# Patient Record
Sex: FEMALE | Race: BLACK OR AFRICAN AMERICAN | Employment: FULL TIME | URBAN - METROPOLITAN AREA
[De-identification: names, ages, dates, MRNs, and addresses within clinical notes are randomized per-mention and may not be internally consistent; named-entity substitution may affect disease eponyms.]

---

## 2017-05-18 ENCOUNTER — APPOINTMENT (OUTPATIENT)
Dept: CT IMAGING | Age: 52
End: 2017-05-18
Attending: EMERGENCY MEDICINE
Payer: COMMERCIAL

## 2017-05-18 ENCOUNTER — APPOINTMENT (OUTPATIENT)
Dept: GENERAL RADIOLOGY | Age: 52
End: 2017-05-18
Attending: EMERGENCY MEDICINE
Payer: COMMERCIAL

## 2017-05-18 ENCOUNTER — HOSPITAL ENCOUNTER (EMERGENCY)
Age: 52
Discharge: HOME | End: 2017-05-18
Attending: EMERGENCY MEDICINE
Payer: COMMERCIAL

## 2017-05-18 VITALS
HEART RATE: 86 BPM | TEMPERATURE: 98.6 F | BODY MASS INDEX: 24.29 KG/M2 | OXYGEN SATURATION: 99 % | DIASTOLIC BLOOD PRESSURE: 90 MMHG | SYSTOLIC BLOOD PRESSURE: 148 MMHG | RESPIRATION RATE: 16 BRPM | WEIGHT: 132 LBS | HEIGHT: 62 IN

## 2017-05-18 DIAGNOSIS — R07.9 CHEST PAIN, UNSPECIFIED TYPE: Primary | ICD-10-CM
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
## 2017-05-18 DIAGNOSIS — M54.2 NECK PAIN: ICD-10-CM

## 2017-05-18 LAB
ALBUMIN SERPL BCP-MCNC: 3.7 G/DL (ref 3.5–5)
ALBUMIN/GLOB SERPL: 0.9 {RATIO} (ref 1.1–2.2)
ALP SERPL-CCNC: 59 U/L (ref 45–117)
ALT SERPL-CCNC: 34 U/L (ref 12–78)
ANION GAP BLD CALC-SCNC: 3 MMOL/L (ref 5–15)
AST SERPL W P-5'-P-CCNC: 27 U/L (ref 15–37)
ATRIAL RATE: 63 BPM
BASOPHILS # BLD AUTO: 0 K/UL (ref 0–0.1)
BASOPHILS # BLD: 0 % (ref 0–1)
BILIRUB SERPL-MCNC: 0.2 MG/DL (ref 0.2–1)
BUN SERPL-MCNC: 14 MG/DL (ref 6–20)
BUN/CREAT SERPL: 16 (ref 12–20)
CALCIUM SERPL-MCNC: 9 MG/DL (ref 8.5–10.1)
CALCULATED P AXIS, ECG09: 12 DEGREES
CALCULATED R AXIS, ECG10: -28 DEGREES
CALCULATED T AXIS, ECG11: 11 DEGREES
CHLORIDE SERPL-SCNC: 106 MMOL/L (ref 97–108)
CO2 SERPL-SCNC: 31 MMOL/L (ref 21–32)
CREAT SERPL-MCNC: 0.89 MG/DL (ref 0.55–1.02)
DIAGNOSIS, 93000: NORMAL
EOSINOPHIL # BLD: 0.2 K/UL (ref 0–0.4)
EOSINOPHIL NFR BLD: 6 % (ref 0–7)
ERYTHROCYTE [DISTWIDTH] IN BLOOD BY AUTOMATED COUNT: 13.7 % (ref 11.5–14.5)
GLOBULIN SER CALC-MCNC: 3.9 G/DL (ref 2–4)
GLUCOSE SERPL-MCNC: 114 MG/DL (ref 65–100)
HCT VFR BLD AUTO: 35.9 % (ref 35–47)
HGB BLD-MCNC: 11.2 G/DL (ref 11.5–16)
LYMPHOCYTES # BLD AUTO: 27 % (ref 12–49)
LYMPHOCYTES # BLD: 1.1 K/UL (ref 0.8–3.5)
MCH RBC QN AUTO: 28 PG (ref 26–34)
MCHC RBC AUTO-ENTMCNC: 31.2 G/DL (ref 30–36.5)
MCV RBC AUTO: 89.8 FL (ref 80–99)
MONOCYTES # BLD: 0.5 K/UL (ref 0–1)
MONOCYTES NFR BLD AUTO: 12 % (ref 5–13)
NEUTS SEG # BLD: 2.2 K/UL (ref 1.8–8)
NEUTS SEG NFR BLD AUTO: 55 % (ref 32–75)
P-R INTERVAL, ECG05: 154 MS
PLATELET # BLD AUTO: 231 K/UL (ref 150–400)
POTASSIUM SERPL-SCNC: 4.6 MMOL/L (ref 3.5–5.1)
PROT SERPL-MCNC: 7.6 G/DL (ref 6.4–8.2)
Q-T INTERVAL, ECG07: 412 MS
QRS DURATION, ECG06: 78 MS
QTC CALCULATION (BEZET), ECG08: 421 MS
RBC # BLD AUTO: 4 M/UL (ref 3.8–5.2)
SODIUM SERPL-SCNC: 140 MMOL/L (ref 136–145)
VENTRICULAR RATE, ECG03: 63 BPM
WBC # BLD AUTO: 4 K/UL (ref 3.6–11)

## 2017-05-18 PROCEDURE — 93005 ELECTROCARDIOGRAM TRACING: CPT

## 2017-05-18 PROCEDURE — 72072 X-RAY EXAM THORAC SPINE 3VWS: CPT

## 2017-05-18 PROCEDURE — 96374 THER/PROPH/DIAG INJ IV PUSH: CPT

## 2017-05-18 PROCEDURE — 99285 EMERGENCY DEPT VISIT HI MDM: CPT

## 2017-05-18 PROCEDURE — 85025 COMPLETE CBC W/AUTO DIFF WBC: CPT | Performed by: EMERGENCY MEDICINE

## 2017-05-18 PROCEDURE — 72125 CT NECK SPINE W/O DYE: CPT

## 2017-05-18 PROCEDURE — 80053 COMPREHEN METABOLIC PANEL: CPT | Performed by: EMERGENCY MEDICINE

## 2017-05-18 PROCEDURE — 72128 CT CHEST SPINE W/O DYE: CPT

## 2017-05-18 PROCEDURE — 36415 COLL VENOUS BLD VENIPUNCTURE: CPT | Performed by: EMERGENCY MEDICINE

## 2017-05-18 PROCEDURE — 74011250636 HC RX REV CODE- 250/636: Performed by: EMERGENCY MEDICINE

## 2017-05-18 PROCEDURE — 71020 XR CHEST PA LAT: CPT

## 2017-05-18 RX ORDER — KETOROLAC TROMETHAMINE 30 MG/ML
30 INJECTION, SOLUTION INTRAMUSCULAR; INTRAVENOUS
Status: COMPLETED | OUTPATIENT
Start: 2017-05-18 | End: 2017-05-18

## 2017-05-18 RX ADMIN — KETOROLAC TROMETHAMINE 30 MG: 30 INJECTION, SOLUTION INTRAMUSCULAR at 09:50
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
## 2017-05-18 NOTE — ED NOTES
Dr. Sri Escoto reviewed discharge instructions with the patient. The patient verbalized understanding. All questions and concerns were addressed. The patient declined a wheelchair and is discharged ambulatory in the care of family members with instructions and prescriptions in hand. Pt is alert and oriented x 4. Respirations are clear and unlabored.
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
## 2017-05-18 NOTE — ED NOTES
Bedside and Verbal shift change report given to RN Merlyn Lilly (oncoming nurse) by Edith Nieto (offgoing nurse). Report included the following information SBAR, Kardex, ED Summary, MAR, Recent Results and Med Rec Status.
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
## 2022-02-20 ENCOUNTER — APPOINTMENT (OUTPATIENT)
Dept: CT IMAGING | Age: 57
DRG: 062 | End: 2022-02-20
Attending: EMERGENCY MEDICINE
Payer: COMMERCIAL

## 2022-02-20 ENCOUNTER — HOSPITAL ENCOUNTER (INPATIENT)
Age: 57
LOS: 3 days | Discharge: HOME HEALTH | DRG: 062 | End: 2022-02-23
Attending: EMERGENCY MEDICINE | Admitting: INTERNAL MEDICINE
Payer: COMMERCIAL

## 2022-02-20 DIAGNOSIS — R20.0 LEFT SIDED NUMBNESS: ICD-10-CM

## 2022-02-20 DIAGNOSIS — R53.1 ACUTE LEFT-SIDED WEAKNESS: ICD-10-CM

## 2022-02-20 DIAGNOSIS — I63.9 CEREBROVASCULAR ACCIDENT (CVA), UNSPECIFIED MECHANISM (HCC): Primary | ICD-10-CM

## 2022-02-20 LAB
ALBUMIN SERPL-MCNC: 3.8 G/DL (ref 3.5–5)
ALBUMIN/GLOB SERPL: 1 {RATIO} (ref 1.1–2.2)
ALP SERPL-CCNC: 64 U/L (ref 45–117)
ALT SERPL-CCNC: 39 U/L (ref 12–78)
ANION GAP SERPL CALC-SCNC: 6 MMOL/L (ref 5–15)
AST SERPL-CCNC: 26 U/L (ref 15–37)
BASOPHILS # BLD: 0 K/UL (ref 0–0.1)
BASOPHILS NFR BLD: 0 % (ref 0–1)
BILIRUB SERPL-MCNC: 0.2 MG/DL (ref 0.2–1)
BUN SERPL-MCNC: 18 MG/DL (ref 6–20)
BUN/CREAT SERPL: 21 (ref 12–20)
CALCIUM SERPL-MCNC: 9.6 MG/DL (ref 8.5–10.1)
CHLORIDE SERPL-SCNC: 104 MMOL/L (ref 97–108)
CHOLEST SERPL-MCNC: 241 MG/DL
CO2 SERPL-SCNC: 28 MMOL/L (ref 21–32)
COMMENT, HOLDF: NORMAL
CREAT SERPL-MCNC: 0.86 MG/DL (ref 0.55–1.02)
DIFFERENTIAL METHOD BLD: ABNORMAL
EOSINOPHIL # BLD: 0.2 K/UL (ref 0–0.4)
EOSINOPHIL NFR BLD: 4 % (ref 0–7)
ERYTHROCYTE [DISTWIDTH] IN BLOOD BY AUTOMATED COUNT: 13.2 % (ref 11.5–14.5)
EST. AVERAGE GLUCOSE BLD GHB EST-MCNC: 143 MG/DL
GLOBULIN SER CALC-MCNC: 3.8 G/DL (ref 2–4)
GLUCOSE BLD STRIP.AUTO-MCNC: 119 MG/DL (ref 65–117)
GLUCOSE SERPL-MCNC: 118 MG/DL (ref 65–100)
HBA1C MFR BLD: 6.6 % (ref 4–5.6)
HCT VFR BLD AUTO: 36 % (ref 35–47)
HDLC SERPL-MCNC: 49 MG/DL
HDLC SERPL: 4.9 {RATIO} (ref 0–5)
HGB BLD-MCNC: 11.3 G/DL (ref 11.5–16)
IMM GRANULOCYTES # BLD AUTO: 0 K/UL (ref 0–0.04)
IMM GRANULOCYTES NFR BLD AUTO: 0 % (ref 0–0.5)
INR PPP: 1 (ref 0.9–1.1)
LDLC SERPL CALC-MCNC: 164 MG/DL (ref 0–100)
LYMPHOCYTES # BLD: 1.8 K/UL (ref 0.8–3.5)
LYMPHOCYTES NFR BLD: 39 % (ref 12–49)
MCH RBC QN AUTO: 28.3 PG (ref 26–34)
MCHC RBC AUTO-ENTMCNC: 31.4 G/DL (ref 30–36.5)
MCV RBC AUTO: 90 FL (ref 80–99)
MONOCYTES # BLD: 0.5 K/UL (ref 0–1)
MONOCYTES NFR BLD: 10 % (ref 5–13)
NEUTS SEG # BLD: 2.1 K/UL (ref 1.8–8)
NEUTS SEG NFR BLD: 47 % (ref 32–75)
NRBC # BLD: 0 K/UL (ref 0–0.01)
NRBC BLD-RTO: 0 PER 100 WBC
PLATELET # BLD AUTO: 243 K/UL (ref 150–400)
PMV BLD AUTO: 10.9 FL (ref 8.9–12.9)
POTASSIUM SERPL-SCNC: 3.6 MMOL/L (ref 3.5–5.1)
PROT SERPL-MCNC: 7.6 G/DL (ref 6.4–8.2)
PROTHROMBIN TIME: 10.5 SEC (ref 9–11.1)
RBC # BLD AUTO: 4 M/UL (ref 3.8–5.2)
SAMPLES BEING HELD,HOLD: NORMAL
SERVICE CMNT-IMP: ABNORMAL
SODIUM SERPL-SCNC: 138 MMOL/L (ref 136–145)
TRIGL SERPL-MCNC: 140 MG/DL (ref ?–150)
TROPONIN-HIGH SENSITIVITY: 6 NG/L (ref 0–51)
TSH SERPL DL<=0.05 MIU/L-ACNC: 1.06 UIU/ML (ref 0.36–3.74)
VLDLC SERPL CALC-MCNC: 28 MG/DL
WBC # BLD AUTO: 4.6 K/UL (ref 3.6–11)

## 2022-02-20 PROCEDURE — 93005 ELECTROCARDIOGRAM TRACING: CPT

## 2022-02-20 PROCEDURE — 4A03X5D MEASUREMENT OF ARTERIAL FLOW, INTRACRANIAL, EXTERNAL APPROACH: ICD-10-PCS | Performed by: RADIOLOGY

## 2022-02-20 PROCEDURE — 82962 GLUCOSE BLOOD TEST: CPT

## 2022-02-20 PROCEDURE — 70496 CT ANGIOGRAPHY HEAD: CPT

## 2022-02-20 PROCEDURE — 99285 EMERGENCY DEPT VISIT HI MDM: CPT

## 2022-02-20 PROCEDURE — 74011250636 HC RX REV CODE- 250/636: Performed by: NURSE PRACTITIONER

## 2022-02-20 PROCEDURE — 3E03317 INTRODUCTION OF OTHER THROMBOLYTIC INTO PERIPHERAL VEIN, PERCUTANEOUS APPROACH: ICD-10-PCS | Performed by: EMERGENCY MEDICINE

## 2022-02-20 PROCEDURE — 74011000250 HC RX REV CODE- 250: Performed by: EMERGENCY MEDICINE

## 2022-02-20 PROCEDURE — APPNB60 APP NON BILLABLE TIME 46-60 MINS: Performed by: NURSE PRACTITIONER

## 2022-02-20 PROCEDURE — 36415 COLL VENOUS BLD VENIPUNCTURE: CPT

## 2022-02-20 PROCEDURE — 96374 THER/PROPH/DIAG INJ IV PUSH: CPT

## 2022-02-20 PROCEDURE — 74011000636 HC RX REV CODE- 636: Performed by: RADIOLOGY

## 2022-02-20 PROCEDURE — 70450 CT HEAD/BRAIN W/O DYE: CPT

## 2022-02-20 PROCEDURE — 96365 THER/PROPH/DIAG IV INF INIT: CPT

## 2022-02-20 PROCEDURE — 74011000258 HC RX REV CODE- 258: Performed by: EMERGENCY MEDICINE

## 2022-02-20 PROCEDURE — 80053 COMPREHEN METABOLIC PANEL: CPT

## 2022-02-20 PROCEDURE — 80061 LIPID PANEL: CPT

## 2022-02-20 PROCEDURE — 74011250636 HC RX REV CODE- 250/636

## 2022-02-20 PROCEDURE — 65270000029 HC RM PRIVATE

## 2022-02-20 PROCEDURE — 74011250637 HC RX REV CODE- 250/637: Performed by: NURSE PRACTITIONER

## 2022-02-20 PROCEDURE — 85610 PROTHROMBIN TIME: CPT

## 2022-02-20 PROCEDURE — 84484 ASSAY OF TROPONIN QUANT: CPT

## 2022-02-20 PROCEDURE — 94762 N-INVAS EAR/PLS OXIMTRY CONT: CPT

## 2022-02-20 PROCEDURE — 96375 TX/PRO/DX INJ NEW DRUG ADDON: CPT

## 2022-02-20 PROCEDURE — 65610000006 HC RM INTENSIVE CARE

## 2022-02-20 PROCEDURE — 85025 COMPLETE CBC W/AUTO DIFF WBC: CPT

## 2022-02-20 PROCEDURE — 74011000250 HC RX REV CODE- 250: Performed by: NURSE PRACTITIONER

## 2022-02-20 PROCEDURE — 74011250636 HC RX REV CODE- 250/636: Performed by: EMERGENCY MEDICINE

## 2022-02-20 PROCEDURE — 0042T CT CODE NEURO PERF W CBF: CPT

## 2022-02-20 PROCEDURE — 83036 HEMOGLOBIN GLYCOSYLATED A1C: CPT

## 2022-02-20 PROCEDURE — 84443 ASSAY THYROID STIM HORMONE: CPT

## 2022-02-20 RX ORDER — ONDANSETRON 2 MG/ML
4 INJECTION INTRAMUSCULAR; INTRAVENOUS
Status: DISCONTINUED | OUTPATIENT
Start: 2022-02-20 | End: 2022-02-23 | Stop reason: HOSPADM

## 2022-02-20 RX ORDER — LOSARTAN POTASSIUM AND HYDROCHLOROTHIAZIDE 12.5; 5 MG/1; MG/1
1 TABLET ORAL DAILY
COMMUNITY

## 2022-02-20 RX ORDER — SODIUM CHLORIDE 9 MG/ML
50 INJECTION, SOLUTION INTRAVENOUS ONCE
Status: DISPENSED | OUTPATIENT
Start: 2022-02-20 | End: 2022-02-21

## 2022-02-20 RX ORDER — ATORVASTATIN CALCIUM 40 MG/1
80 TABLET, FILM COATED ORAL
Status: DISCONTINUED | OUTPATIENT
Start: 2022-02-20 | End: 2022-02-23 | Stop reason: HOSPADM

## 2022-02-20 RX ORDER — LABETALOL HYDROCHLORIDE 5 MG/ML
10 INJECTION, SOLUTION INTRAVENOUS
Status: DISCONTINUED | OUTPATIENT
Start: 2022-02-20 | End: 2022-02-23 | Stop reason: HOSPADM

## 2022-02-20 RX ORDER — SODIUM CHLORIDE 9 MG/ML
50 INJECTION, SOLUTION INTRAVENOUS ONCE
Status: COMPLETED | OUTPATIENT
Start: 2022-02-20 | End: 2022-02-20

## 2022-02-20 RX ORDER — SODIUM CHLORIDE, SODIUM LACTATE, POTASSIUM CHLORIDE, CALCIUM CHLORIDE 600; 310; 30; 20 MG/100ML; MG/100ML; MG/100ML; MG/100ML
50 INJECTION, SOLUTION INTRAVENOUS CONTINUOUS
Status: DISCONTINUED | OUTPATIENT
Start: 2022-02-20 | End: 2022-02-21

## 2022-02-20 RX ORDER — LABETALOL HYDROCHLORIDE 5 MG/ML
10 INJECTION, SOLUTION INTRAVENOUS
Status: COMPLETED | OUTPATIENT
Start: 2022-02-20 | End: 2022-02-20

## 2022-02-20 RX ADMIN — LABETALOL HYDROCHLORIDE 5 MG: 5 INJECTION INTRAVENOUS at 16:46

## 2022-02-20 RX ADMIN — ALTEPLASE 6.15 MG: KIT at 16:33

## 2022-02-20 RX ADMIN — SODIUM CHLORIDE 50 ML: 9 INJECTION, SOLUTION INTRAVENOUS at 17:33

## 2022-02-20 RX ADMIN — IOPAMIDOL 80 ML: 755 INJECTION, SOLUTION INTRAVENOUS at 17:17

## 2022-02-20 RX ADMIN — ATORVASTATIN CALCIUM 80 MG: 40 TABLET, FILM COATED ORAL at 22:10

## 2022-02-20 RX ADMIN — ALTEPLASE 55.32 MG: KIT at 16:36

## 2022-02-20 RX ADMIN — LABETALOL HYDROCHLORIDE 10 MG: 5 INJECTION INTRAVENOUS at 20:09

## 2022-02-20 RX ADMIN — SODIUM CHLORIDE, POTASSIUM CHLORIDE, SODIUM LACTATE AND CALCIUM CHLORIDE 50 ML/HR: 600; 310; 30; 20 INJECTION, SOLUTION INTRAVENOUS at 19:10

## 2022-02-20 RX ADMIN — LABETALOL HYDROCHLORIDE 10 MG: 5 INJECTION INTRAVENOUS at 18:18

## 2022-02-20 RX ADMIN — IOPAMIDOL 40 ML: 755 INJECTION, SOLUTION INTRAVENOUS at 17:16

## 2022-02-20 NOTE — H&P
[facility]    ICU Team Consult Note    Name: Salena De La Fuente   : 1965   MRN: [id number]   Date: 2022      History of Presenting Illness:      Patient is a 61year old female with a hx of HTN who presents w sudden onset of left upper and lower extremity weakness, left sided sensory changes and difficulty ambulating X 45 minutes. NIH at the time of my exam is 2. Patient has a past medication history of hyperlipidemia and HTN. No other past medical history. Currently at time of exam she is complaining of left sided weakness, mild nausea, light headedness and some left arm and leg numbness. 1a-LOC:0    1b-Month/Age:0    1c-Open/Close Hand:0    2-Best Gaze:0    3-Visual Fields:0    4-Facial Palsy:0    5a-Left Arm:0    5b-Right Arm:0    6a-Left Le    6b-Right Le    7-Limb Ataxia:0    8-Sensory:1 (left arm and left leg numbness)    9-Best Language:0    10-Dysarthria:0    11-Extinction/Inattention:0  TOTAL SCORE:2    Subjective:   Progress Note: 2022      Patient is asked to be seen by Dr. Valery Vang  for possible CVA s/p TPA administration, necessitating the need for possible ICU care. Reason for ICU Admission: s/p TPA administration       Active Problem List:     Problem List  Date Reviewed: 2016          Codes Class    CVA (cerebral vascular accident) ([facility] [address] ICD-10-CM: I63.9  ICD-9-CM: 434.91         SOB (shortness of breath) ICD-10-CM: R06.02  ICD-9-CM: 786.05         Murmur, cardiac ICD-10-CM: R01.1  ICD-9-CM: 785.2               Past Medical History:      has a past medical history of Hyperlipidemia. Past Surgical History:      has no past surgical history on file. Home Medications:     Prior to Admission medications    Medication Sig Start Date End Date Taking? Authorizing Provider   multivitamin (ONE A DAY) tablet Take 1 Tab by mouth daily. Provider, Historical   ferrous sulfate 325 mg (65 mg iron) tablet Take  by mouth Daily (before breakfast). Provider, Historical   omega-3 fatty acids cap Take  by mouth. Provider, Historical       Allergies/Social/Family History: Allergies   Allergen Reactions    Adhesive Rash     Allergic to paper tape    Adhesive Tape-Silicones Rash    Sulfa (Sulfonamide Antibiotics) Rash      Social History     Tobacco Use    Smoking status: Former Smoker    Smokeless tobacco: Never Used   Substance Use Topics    Alcohol use: Yes     Alcohol/week: 0.0 standard drinks      No family history on file. Review of Systems:     A comprehensive review of systems was negative except for that written in the HPI. Objective:   Vital Signs:  Visit Vitals  BP (!) 121/93   Pulse 71   Temp 98.3 °F (36.8 °C)   Resp 17   Ht 5' 3\" (1.6 m)   Wt 68.3 kg (150 lb 9.2 oz)   SpO2 99%   BMI 26.67 kg/m²      O2 Device: None Temp (24hrs), Av.3 °F (36.8 °C), Min:98.3 °F (36.8 °C), Max:98.3 °F (36.8 °C)           Intake/Output:     Intake/Output Summary (Last 24 hours) at 2022 1744  Last data filed at 2022 1734  Gross per 24 hour   Intake 53.46 ml   Output --   Net 53.46 ml       Physical Exam:    General:  alert, cooperative, no distress, appears stated age  Neurologic:  oriented, positive findings: muscular weakness left upper and lower extremity and sensory deficit left upper and lower extremity. Lungs:  clear to auscultation bilaterally  Heart:  regular rate and rhythm, S1, S2 normal, no murmur, click, rub or gallop  Abdomen:  soft, non-tender.  Bowel sounds normal. No masses,  no organomegaly  Cardiovascular:  Regular rate and rhythm, S1S2 present, without murmur or extra heart sounds, pedal pulses normal and no edema  Skin:  Normal.    LABS AND  DATA: Personally reviewed  Recent Labs     22  1626   WBC 4.6   HGB 11.3*   HCT 36.0        Recent Labs     22  1626      K 3.6      CO2 28   BUN 18   CREA 0.86   *   CA 9.6     Recent Labs     22  1626   AP 64   TP 7.6   ALB 3.8   GLOB 3.8 Recent Labs     02/20/22  1626   INR 1.0   PTP 10.5      No results for input(s): PHI, PCO2I, PO2I, FIO2I in the last 72 hours. No results for input(s): CPK, CKMB, TROIQ, BNPP in the last 72 hours. Hemodynamics:   PAP:   CO:     Wedge:   CI:     CVP:    SVR:       PVR:       Ventilator Settings:  Mode Rate Tidal Volume Pressure FiO2 PEEP                    Peak airway pressure:      Minute ventilation:          MEDS: Reviewed    Chest X-Ray: personally reviewed and report checked    ECHO pending     Assessment:     ICU Problems:  CVA   Hypertension     ICU Comprehensive Plan of Care:   Plans for this Shift:   1. Post Alteplase protocol orders in place including frequent vitals and q1 hour neuro checks   2. MRI or CT head 24 hours post TPA   3. Keep BP < 180/105  , labetalol PRN   4. Neurology consulted   5. PT/OT/ Speech Therapy Consult   6. Stroke workup    Multidisciplinary Rounds Completed:  N/A    ABCDEF Bundle/Checklist  Pain Medications: None  Target RASS: 0 - Alert & Calm - Spontaneously pays attention to caregiver  Sedation Medications: None  CAM-ICU:  Negative  Mobility: Good   PT/OT: PT consulted and on board, OT consulted and on board and Speech therapy consulted and on board   Restraints: None needed at this time  Discussed Plan of Care (goals of care): Yes  Addressed Code Status: Full Code    CARDIOVASCULAR  Cardiac Gtts: None  SBP Goal of: < 180 mmHg  MAP Goal of: > 65 mmHg  Transfusion Trigger (Hgb): <7 g/dL    RESPIRATORY  Vent Goals:    Incentive spirometry  DVT Prophylaxis (if no, list reason): SCD's or Sequential Compression Device   SPO2 Goal: > 92%  Pulmonary toilet: Incentive Spirometry     GI/  Villanueva Catheter Present: No  GI Prophylaxis: Not at this time   Nutrition: No   IVFs: LR @ 50/hr   Bowel Movement: No  Bowel Regimen: None needed at this time  Insulin: none     ANTIBIOTICS  Antibiotics:  none    T/L/D  Tubes: None  Lines: Peripheral IV  Drains: None    SPECIAL EQUIPMENT  None    DISPOSITION  Stay in ICU    CRITICAL CARE CONSULTANT NOTE  I had a face to face encounter with the patient, reviewed and interpreted patient data including clinical events, labs, images, vital signs, I/O's, and examined patient. I have discussed the case and the plan and management of the patient's care with the consulting services, the bedside nurses and the respiratory therapist.      NOTE OF PERSONAL INVOLVEMENT IN CARE   This patient has a high probability of imminent, clinically significant deterioration, which requires the highest level of preparedness to intervene urgently. I participated in the decision-making and personally managed or directed the management of the following life and organ supporting interventions that required my frequent assessment to treat or prevent imminent deterioration. I personally spent 35 minutes of critical care time. This is time spent at this critically ill patient's bedside actively involved in patient care as well as the coordination of care and discussions with the patient's family. This does not include any procedural time which has been billed separately.     [facility]  2/20/2022

## 2022-02-20 NOTE — PROGRESS NOTES
Neurocritical Care Code Stroke Documentation      Symptoms:   left-sided weakness, nausea, lightheadedness, left-sided numbness  She denies any changes in her speech or vision. She has no prior history of a stroke. Initial /99   Last Known Well: Around 3:00 pm   Medical hx:   Past Medical History:   Diagnosis Date    Hyperlipidemia    HTN   Remote smoker (quit 30 years ago)  She reports she drinks a glass of wine every other day. Anticoagulation: None    VAN:   Negative   NIHSS:   1a-LOC:0    1b-Month/Age:0    1c-Open/Close Hand:0    2-Best Gaze:0    3-Visual Fields:0    4-Facial Palsy:0    5a-Left Arm:0    5b-Right Arm:0    6a-Left Le    6b-Right Le    7-Limb Ataxia:0    8-Sensory:1 (left arm and left leg numbness)    9-Best Language:0    10-Dysarthria:0    11-Extinction/Inattention:0  TOTAL SCORE:2   Imaging:   CT: No acute process. CTA: No significant abnormalities    CTP: No acute findings   Plan:   TPA Candidate: YES    Mechanical thrombectomy Candidate: NO   She is noted to have /108 and repeat 149/101. 10 mg of labetalol ordered by ED Physician. BP is improving with rechecking, but RN will give 5 mg of labetalol for BP control with tPA on board. Patient is alert and oriented x 4. PERRL. Following commands. Mild left leg drift present with sensory changes on the left arm and left leg, otherwise exam is non-focal.     PLAN:  Admit to ICU post tPA  Neurology consult for stroke evaluation   Stroke work-up orders placed (MRI of Brain, lipid panel, Hgb A1C, and ECHO)  Check TSH and troponin  Keep BP <180/105 s/p IV tPA, labetalol PRN  Follow neuro checks post tPA protocol   PT/OT/SLP evals     Discussed with Dr. Dior Monzon and patient. Also discussed plan with the Intensivist.    I also called and spoke with the patient's daughter on plan of care. Arrival time: 1552  Time spent: 60 minutes.      Mine Garcia NP  Neurocritical Care Nurse Practitioner

## 2022-02-20 NOTE — PROGRESS NOTES
1810: TRANSFER - IN REPORT:    Verbal report received from [address] [place] [address] on [facility]  being received from ED for routine progression of care      Report consisted of patients Situation, Background, Assessment and   Recommendations(SBAR). Information from the following report(s) SBAR and Dual Neuro Assessment was reviewed with the receiving nurse. Opportunity for questions and clarification was provided. Assessment completed upon patients arrival to unit and care assumed. 1930: Bedside and Verbal shift change report given to [facility] RN/[place] RN (oncoming nurse) by Leon Gordon RN (offgoing nurse). Report included the following information SBAR, Kardex, ED Summary, Procedure Summary, Intake/Output, MAR, Cardiac Rhythm SR and Dual Neuro Assessment.

## 2022-02-20 NOTE — ED NOTES
Patient notified me that she is currently carrying a personal firearm. Security notified. Jaelyn joseph came to the bedside to disarm the firearm. Patient's firearm released to security for lock-up until patient's friend arrives to  the firearm. [address] released the firearm to Mr. Shineenso Screws

## 2022-02-20 NOTE — ED TRIAGE NOTES
Triage: Pt arrives from [facility] with CC of left sided weakness. She reports her left arm and leg feel heavy. She denies changes to vision or speech. Symptom onset 45 minutes ago.

## 2022-02-20 NOTE — ED PROVIDER NOTES
61 yr old w hx of HTN presents w sudden onset left upper and lower extremity weakness and difficulty ambulating x 45 minutes. No hx of CVA or ICH. The history is provided by the patient. Extremity Weakness   This is a new problem. The current episode started less than 1 hour ago. The problem occurs constantly. The problem has not changed since onset. The pain is present in the left arm, left upper leg and left lower leg. The patient is experiencing no pain. Pertinent negatives include no numbness. She has tried nothing for the symptoms. There has been no history of extremity trauma. Past Medical History:   Diagnosis Date    Hyperlipidemia        No past surgical history on file. No family history on file. Social History     Socioeconomic History    Marital status:      Spouse name: Not on file    Number of children: Not on file    Years of education: Not on file    Highest education level: Not on file   Occupational History    Not on file   Tobacco Use    Smoking status: Former Smoker    Smokeless tobacco: Never Used   Substance and Sexual Activity    Alcohol use: Yes     Alcohol/week: 0.0 standard drinks    Drug use: No    Sexual activity: Not on file   Other Topics Concern    Not on file   Social History Narrative    ** Merged History Encounter **          Social Determinants of Health     Financial Resource Strain:     Difficulty of Paying Living Expenses: Not on file   Food Insecurity:     Worried About Running Out of Food in the Last Year: Not on file    Miley of Food in the Last Year: Not on file   Transportation Needs:     Lack of Transportation (Medical): Not on file    Lack of Transportation (Non-Medical):  Not on file   Physical Activity:     Days of Exercise per Week: Not on file    Minutes of Exercise per Session: Not on file   Stress:     Feeling of Stress : Not on file   Social Connections:     Frequency of Communication with Friends and Family: Not on file    Frequency of Social Gatherings with Friends and Family: Not on file    Attends Jehovah's witness Services: Not on file    Active Member of Clubs or Organizations: Not on file    Attends Club or Organization Meetings: Not on file    Marital Status: Not on file   Intimate Partner Violence:     Fear of Current or Ex-Partner: Not on file    Emotionally Abused: Not on file    Physically Abused: Not on file    Sexually Abused: Not on file   Housing Stability:     Unable to Pay for Housing in the Last Year: Not on file    Number of [place] in the Last Year: Not on file    Unstable Housing in the Last Year: Not on file         ALLERGIES: Adhesive, Adhesive tape-silicones, and Sulfa (sulfonamide antibiotics)    Review of Systems   Neurological: Positive for weakness. Negative for speech difficulty and numbness. All other systems reviewed and are negative. Vitals:    22 1550   BP: (!) 162/99   Pulse: 86   Resp: 16   Temp: 98.3 °F (36.8 °C)   SpO2: 100%            Physical Exam  Vitals and nursing note reviewed. Constitutional:       Appearance: Normal appearance. She is well-developed. HENT:      Head: Normocephalic and atraumatic. Eyes:      General: No scleral icterus. Cardiovascular:      Rate and Rhythm: Normal rate. Pulmonary:      Effort: Pulmonary effort is normal.   Abdominal:      General: There is no distension. Musculoskeletal:      Cervical back: Normal range of motion. Skin:     General: Skin is warm and dry. Findings: No erythema or rash. Neurological:      Mental Status: She is alert and oriented to person, place, and time. Cranial Nerves: Cranial nerve deficit present. Sensory: No sensory deficit. Motor: Weakness present. Psychiatric:         Mood and Affect: Mood is anxious. Speech: Speech is rapid and pressured.          Behavior: Behavior normal.          MDM       Procedures        MEDICAL DECISION MAKIN y.o. female presents with Extremity Weakness    Differential diagnosis includes but not limited to:  T  seizure, meningitis, stroke, sepsis, subarachnoid hemorrhage, intracranial bleeding, encephalitis     LABORATORY TESTS:  Labs Reviewed   GLUCOSE, POC - Abnormal; Notable for the following components:       Result Value    Glucose (POC) 119 (*)     All other components within normal limits   CBC WITH AUTOMATED DIFF   METABOLIC PANEL, COMPREHENSIVE   PROTHROMBIN TIME + INR       IMAGING RESULTS:  CT CODE NEURO HEAD WO CONTRAST    (Results Pending)   CTA CODE NEURO HEAD AND NECK W CONT    (Results Pending)   CT CODE NEURO PERF W CBF    (Results Pending)       MEDICATIONS GIVEN:  Medications   iopamidoL (ISOVUE-370) 76 % injection 100 mL (has no administration in time range)   iopamidoL (ISOVUE-370) 76 % injection 50 mL (has no administration in time range)       PROGRESS NOTE:   The patient's ED course has been uncomplicated    CONSULTS:  Perfect Serve Consult for Admission  4:04 PM    ED Room Number: ER16/16  Patient Name and age:  Jennifer Lainez [facility] 61 y.o.  female  Working Diagnosis:   1. Cerebrovascular accident (CVA), unspecified mechanism ([address]    2. Acute left-sided weakness    3. Left sided numbness      COVID-19 Suspicion:  no  Sepsis present:  no  Reassessment needed: no  Code Status:  Full Code  Readmission: no  Isolation Requirements:  no  Recommended Level of Care:  ICU  Department:[facility] Adult ED - 21   Other:  Code stroke tPA infusing    Neurology: 4:04 PM   Code Stroke Level 1 was activated by the ER nurse as the patient presented with symptoms consistent with stroke. The last time known well was approximately 1 hour ago. The patient is in the window for tPA administration and will be evaluated by the on-call acute care tele-neurologist, Dr. Stephy Goodson. After evaluation, tPA was recommended and the patient was consented for the infusion.         PLAN:  - Admit to ICU    Total critical care time spent exclusive of procedures:  50 minutes    Juli Chan MD          Please note that this dictation was completed with IMRSV, the computer voice recognition software. Quite often unanticipated grammatical, syntax, homophones, and other interpretive errors are inadvertently transcribed by the computer software. Please disregard these errors. Please excuse any errors that have escaped final proofreading.

## 2022-02-21 ENCOUNTER — APPOINTMENT (OUTPATIENT)
Dept: MRI IMAGING | Age: 57
DRG: 062 | End: 2022-02-21
Attending: NURSE PRACTITIONER
Payer: COMMERCIAL

## 2022-02-21 ENCOUNTER — APPOINTMENT (OUTPATIENT)
Dept: NON INVASIVE DIAGNOSTICS | Age: 57
DRG: 062 | End: 2022-02-21
Attending: NURSE PRACTITIONER
Payer: COMMERCIAL

## 2022-02-21 PROBLEM — G81.94 LEFT HEMIPARESIS (HCC): Status: ACTIVE | Noted: 2022-02-20

## 2022-02-21 LAB
ATRIAL RATE: 65 BPM
CALCULATED P AXIS, ECG09: 37 DEGREES
CALCULATED R AXIS, ECG10: -41 DEGREES
CALCULATED T AXIS, ECG11: 5 DEGREES
DIAGNOSIS, 93000: NORMAL
P-R INTERVAL, ECG05: 172 MS
Q-T INTERVAL, ECG07: 434 MS
QRS DURATION, ECG06: 92 MS
QTC CALCULATION (BEZET), ECG08: 451 MS
VENTRICULAR RATE, ECG03: 65 BPM

## 2022-02-21 PROCEDURE — 65610000006 HC RM INTENSIVE CARE

## 2022-02-21 PROCEDURE — 93306 TTE W/DOPPLER COMPLETE: CPT | Performed by: SPECIALIST

## 2022-02-21 PROCEDURE — 97116 GAIT TRAINING THERAPY: CPT

## 2022-02-21 PROCEDURE — 70551 MRI BRAIN STEM W/O DYE: CPT

## 2022-02-21 PROCEDURE — 99223 1ST HOSP IP/OBS HIGH 75: CPT | Performed by: PSYCHIATRY & NEUROLOGY

## 2022-02-21 PROCEDURE — 97161 PT EVAL LOW COMPLEX 20 MIN: CPT

## 2022-02-21 PROCEDURE — 97165 OT EVAL LOW COMPLEX 30 MIN: CPT

## 2022-02-21 PROCEDURE — 93306 TTE W/DOPPLER COMPLETE: CPT

## 2022-02-21 PROCEDURE — 74011250636 HC RX REV CODE- 250/636: Performed by: NURSE PRACTITIONER

## 2022-02-21 PROCEDURE — 74011250637 HC RX REV CODE- 250/637: Performed by: NURSE PRACTITIONER

## 2022-02-21 PROCEDURE — 97112 NEUROMUSCULAR REEDUCATION: CPT

## 2022-02-21 RX ORDER — ACETAMINOPHEN 325 MG/1
650 TABLET ORAL
Status: DISCONTINUED | OUTPATIENT
Start: 2022-02-21 | End: 2022-02-23 | Stop reason: HOSPADM

## 2022-02-21 RX ORDER — GUAIFENESIN 100 MG/5ML
81 LIQUID (ML) ORAL DAILY
Status: DISCONTINUED | OUTPATIENT
Start: 2022-02-22 | End: 2022-02-23 | Stop reason: HOSPADM

## 2022-02-21 RX ORDER — SODIUM CHLORIDE, SODIUM LACTATE, POTASSIUM CHLORIDE, CALCIUM CHLORIDE 600; 310; 30; 20 MG/100ML; MG/100ML; MG/100ML; MG/100ML
50 INJECTION, SOLUTION INTRAVENOUS CONTINUOUS
Status: DISCONTINUED | OUTPATIENT
Start: 2022-02-21 | End: 2022-02-21

## 2022-02-21 RX ADMIN — ACETAMINOPHEN 650 MG: 325 TABLET ORAL at 20:21

## 2022-02-21 RX ADMIN — ATORVASTATIN CALCIUM 80 MG: 40 TABLET, FILM COATED ORAL at 21:22

## 2022-02-21 RX ADMIN — SODIUM CHLORIDE, POTASSIUM CHLORIDE, SODIUM LACTATE AND CALCIUM CHLORIDE 50 ML/HR: 600; 310; 30; 20 INJECTION, SOLUTION INTRAVENOUS at 09:56

## 2022-02-21 NOTE — CONSULTS
NEUROLOGY CONSULT NOTE    Name Colette Nick Age 61 y.o. MRN [id number]  1965     Consulting Physician: Wild Reveles MD      Chief Complaint:  L-HP, sensory deficit     Assessment:     Active Problems:    Left hemiparesis [facility]) (2022)      61year old AAF with a h/o HTN, HPL admitted with acute left hemiparesis/hemisensory deficit 22 s/p tPA. Symptoms are presently resolved with non-focal examination. CTH/CTA were reviewed without acute intracranial pathology, LVO or significant stenosis. Possible posterior circulation TIA vs small infarction. Recommendations:   MRI Brain WO 24h post tPA-if no evidence of hemorrhage may start ASA 81mg daily for stroke prevention  Continue high dose statin therapy  Goal SBP<140, LDL<70  Telemetry/TTE-Cardiology consultation due to reported recent palpitations and dizziness  Stroke education  No anticipated skilled needs    Thank you very much for this referral. I appreciate the opportunity to participate in this patient's care. History of Present Illness: This is a 61 y.o.   female, we were asked to see for left hemiparesis and left sensory deficit. PMH notable for HTN, HPL. She reported rather acute onset LUE/LLE weakness, numbness and tingling yesterday afternoon with sparing of the left face. She denied speech/vision deficit but admitted to dizziness/LH, nausea and mild headache. She denies h/o TIA or prior stroke. She is not maintained on antiplatelet therapy PTA. Symptoms appear improved this AM. She did receive tPA on arrival. CTH/CTA were reviewed without acute intracranial pathology, LVO or significant stenosis. . Allergies   Allergen Reactions    Adhesive Rash     Allergic to paper tape    Adhesive Tape-Silicones Rash    Sulfa (Sulfonamide Antibiotics) Rash        Prior to Admission medications    Medication Sig Start Date End Date Taking?  Authorizing Provider   ascorbic acid (VITAMIN C PO) Take 1 Tablet by mouth daily. Yes Provider, Historical   ZINC PO Take 1 Tablet by mouth daily. Yes Provider, Historical   cholecalciferol, vitamin D3, (VITAMIN D3 PO) Take 1 Tablet by mouth daily. Yes Provider, Historical   losartan-hydroCHLOROthiazide (HYZAAR) 50-12.5 mg per tablet Take 1 Tablet by mouth daily. Yes Provider, Historical   omega-3 fatty acids cap Take 1 Caplet by mouth daily. Yes Provider, Historical       Past Medical History:   Diagnosis Date    Hyperlipidemia         Social History     Tobacco Use    Smoking status: Former Smoker    Smokeless tobacco: Never Used   Substance Use Topics    Alcohol use: Yes     Alcohol/week: 0.0 standard drinks      PSH/FHX reviewed and non-contributory. Review of Systems:   Comprehensive review of systems performed and negative except for as listed above. Exam:     Visit Vitals  /85   Pulse 71   Temp 98 °F (36.7 °C)   Resp 22   Ht 5' 3\" (1.6 m)   Wt 147 lb 11.3 oz (67 kg)   SpO2 98%   Breastfeeding No   BMI 26.17 kg/m²        General: Well developed, well nourished. Patient in no apparent distress   Head: Normocephalic, atraumatic, anicteric sclera   Lungs:  Clear to auscultation bilaterally, No wheezes or rubs   Cardiac: Regular rate and rhythm with no murmurs. Abd: Bowel sounds were audible. No tenderness on palpation   Ext: No pedal edema   Skin: No overt signs of rash     Neurological Exam:  Mental Status: Alert and oriented to person place and time   Speech: Fluent no aphasia or dysarthria. Cranial Nerves:   Intact visual fields. Facial sensation is normal. Facial movement is symmetric. Palate is midline. Normal sternocleidomastoid strength. Tongue is midline. Hearing is intact bilaterally. Eyes: PERRL, EOM's full, no nystagmus, no ptosis. Motor:  Full and symmetric strength of upper and lower proximal and distal muscles. Normal bulk and tone.     Reflexes:   Deep tendon reflexes 2+/4 and symmetrical.  Plantar response is downgoing b/l. Sensory:   Symmetrically intact  with no perceived deficits modalities involving small or large fibers. Gait:  Gait is deferred   Tremor:   No tremor noted. Cerebellar:  Finger to nose and heel over shin to knee was demonstrated competently. Neurovascular: No carotid bruits. Imaging  CT Results (maximum last 3): Results from [facility] encounter on 02/20/22    CT CODE NEURO PERF W CBF    Narrative  Clinical indication: Code stroke. CT brain perfusion was performed with generation of hemodynamic maps of multiple  parameters, including cerebral blood flow, cerebral blood volume, and MTT (mean  transit time). CT dose reduction was achieved through use of a standardized  protocol tailored for this examination and automatic exposure control for dose  modulation. No significant acute abnormality. Impression  No acute findings. CTA CODE NEURO HEAD AND NECK W CONT    Narrative  EXAM: CTA CODE NEURO HEAD AND NECK W CONT    INDICATION: Code Stroke    COMPARISON: None. CONTRAST: 100 mL of Isovue-370. TECHNIQUE:  Unenhanced  images were obtained to localize the volume for  acquisition. Multislice helical axial CT angiography was performed from the  aortic arch to the top of the head during uneventful rapid bolus intravenous  contrast administration. Coronal and sagittal reformations and 3D post  processing was performed. CT dose reduction was achieved through use of a  standardized protocol tailored for this examination and automatic exposure  control for dose modulation. This study was analyzed by the [address] ai algorithm. FINDINGS:    Head CT obtained after intravenous contrast show no enhancing lesion or masses. NASCET method was utilized for calculating stenosis. Origins of the vessels from the thoracic arch show no significant stenosis. The right vertebral is in the neck is a dominant vessel.   Carotid bifurcation show no significant lesion. Intracranially there is no large vessel occlusion, filling defects stenosis or  vascular malformation. Next    Impression  No significant abnormalities suspected. CT CODE NEURO HEAD WO CONTRAST    Narrative  EXAM: CT CODE NEURO HEAD WO CONTRAST    INDICATION: Code Stroke acute left extremity weakness    COMPARISON: None. CONTRAST: None. TECHNIQUE: Unenhanced CT of the head was performed using 5 mm images. Brain and  bone windows were generated. Coronal and sagittal reformats. CT dose reduction  was achieved through use of a standardized protocol tailored for this  examination and automatic exposure control for dose modulation. FINDINGS:  No extra-axial fluid collection, hemorrhage shift or masses. Impression  Negative. Lab Review  Lab Results   Component Value Date/Time    WBC 4.6 02/20/2022 04:26 PM    HCT 36.0 02/20/2022 04:26 PM    HGB 11.3 (L) 02/20/2022 04:26 PM    PLATELET 542 65/92/1225 04:26 PM     Lab Results   Component Value Date/Time    Sodium 138 02/20/2022 04:26 PM    Potassium 3.6 02/20/2022 04:26 PM    Chloride 104 02/20/2022 04:26 PM    CO2 28 02/20/2022 04:26 PM    Glucose 118 (H) 02/20/2022 04:26 PM    BUN 18 02/20/2022 04:26 PM    Creatinine 0.86 02/20/2022 04:26 PM    Calcium 9.6 02/20/2022 04:26 PM     No components found for: TROPQUANT  No results found for: KAVON    Signed:  Ophelia Hill.  Fouzia Gutierrez DO  2/21/2022  12:59 PM

## 2022-02-21 NOTE — PROGRESS NOTES
SLP Contact Note    SLP Contact Note    Consult received and appreciated. Patient chart reviewed. CT negative for acute infarct. Pt passed swallow screen. Will await MRI and can complete evaluation if indicated.       Thank you,  LUCA GonzalezEd, [id number] [facility]  Speech-Language Pathologist

## 2022-02-21 NOTE — ACP (ADVANCE CARE PLANNING)
Advance Care Planning   Advance Care Planning Inpatient Note  [address] Department    Today's Date: 2/21/2022  Unit: [facility] 7S1 INTENSIVE CARE    Received request from admission screening. Upon review of chart and communication with care team, patient's decision making abilities are not in question. Patient was/were present in the room during visit. Goals of ACP Conversation:  Discuss Advance Care planning documents    Health Care Decision Makers:      Primary Decision Maker: Daniela Coates - Daughter - [phone]    Secondary Decision Maker: Lubna Rowley Child - [phone]      Summary:  Completed New Documents    Advance Care Planning Documents (Patient Wishes) on file:  Healthcare Power of /Advance Directive appointment of Health care agent  Living Will/ Advance Directive  Anatomical Gift/Organ donation     Assessment:    In-basket request for AMD. Facilitated and discussion on Advance Directives and educated Kenzie about documentation. During discussion of AMD some family dynamics arose around ARLENE son Conor Ledbetter which caused her to become emotional reactions. After some discussion, advance directive completed in alignment with Edmund's values and beliefs.  addressed all questions and concerns.       Interventions:  Provided education on documents for clarity and greater understanding  Discussed and provided education on state decision maker hierarchy  Assisted in the completion of documents according to patient's wishes at this time  Encouraged ongoing ACP conversation with future decision makers and loved ones    Care Preferences Communicated:  No    Outcomes/Plan:  ACP Discussion Completed  New Advance Directive completed  Returned original document(s) to patient, as well as copies for distribution to appointed agents  Copy of Advance Directive given to staff to scan into medical record  Teach Back Method used to verify the patient/Healthcare Decision Maker's understanding of key information in the advance directive documents    Rev.  [facility] on 2/21/2022 at 11:03 AM

## 2022-02-21 NOTE — PROGRESS NOTES
Occupational Therapy  02/21/22    Orders received, chart reviewed and patient evaluated by occupational therapy. Pending progression with skilled acute occupational therapy, recommend:  No skilled occupational therapy/ follow up rehabilitation needs identified at this time. Recommend with nursing ADLs with supervision/setup, OOB to chair 3x/day and toileting via functional mobility to and from bathroom . Thank you for completing as able in order to maintain patient strength, endurance and independence. Full evaluation to follow.      Thank you,   Erik Willis, WILLIAM, OTR/L

## 2022-02-21 NOTE — PROGRESS NOTES
1930: Bedside shift change report given to Meryle Beecham, RN and RAN Lee (oncoming nurse) by Haydee Ovalle RN and Melodie Stauffer (offgoing nurse). Report included the following information SBAR, Kardex, ED Summary, Intake/Output, MAR, Recent Results, Cardiac Rhythm SR, Alarm Parameters  and Dual Neuro Assessment. Pt reported decreased sensation in LUE and LLE at the beginning of the shift but by ~2230 this complaint had resolved. Neuro assessment has since been WDL. 0730: Bedside shift change report given to Harpal Hernández RN (oncoming nurse) by Chapo Neal (offgoing nurse). Report included the following information SBAR, Kardex, ED Summary, Intake/Output, MAR, Cardiac Rhythm SR/SB, Alarm Parameters  and Dual Neuro Assessment.

## 2022-02-21 NOTE — PROGRESS NOTES
[facility]    ICU Team Consult Note    Name: Salena De La Fuente   : 1965   MRN: [id number]   Date: 2022      History of Presenting Illness:      Patient is a 61year old female with a hx of HTN who presents w sudden onset of left upper and lower extremity weakness, left sided sensory changes and difficulty ambulating X 45 minutes. NIH at the time of my exam is 2. Patient has a past medication history of hyperlipidemia and HTN. No other past medical history. Currently at time of exam she is complaining of left sided weakness, mild nausea, light headedness and some left arm and leg numbness. NIHSS 2., Received TPA on 22 at 1633. Subjective:   Progress Note: 2022    Patient is awake and alert this am. RIVERO  Left sided weakness and numbness has resolved NIHSS 0  Tolerating PO well. No complains of headache or pain. No N/V  Will be getting MRI this afternoon   Neurology consulted to follow    Active Problem List:     Problem List  Date Reviewed: 2016          Codes Class    CVA (cerebral vascular accident) ([facility] [address] ICD-10-CM: I63.9  ICD-9-CM: 434.91         SOB (shortness of breath) ICD-10-CM: R06.02  ICD-9-CM: 786.05         Murmur, cardiac ICD-10-CM: R01.1  ICD-9-CM: 785.2               Past Medical History:      has a past medical history of Hyperlipidemia. Past Surgical History:      has no past surgical history on file. Home Medications:     Prior to Admission medications    Medication Sig Start Date End Date Taking? Authorizing Provider   ascorbic acid (VITAMIN C PO) Take 1 Tablet by mouth daily. Yes Provider, Historical   ZINC PO Take 1 Tablet by mouth daily. Yes Provider, Historical   cholecalciferol, vitamin D3, (VITAMIN D3 PO) Take 1 Tablet by mouth daily. Yes Provider, Historical   losartan-hydroCHLOROthiazide (HYZAAR) 50-12.5 mg per tablet Take 1 Tablet by mouth daily.    Yes Provider, Historical   omega-3 fatty acids cap Take 1 Caplet by mouth daily. Yes Provider, Historical       Allergies/Social/Family History: Allergies   Allergen Reactions    Adhesive Rash     Allergic to paper tape    Adhesive Tape-Silicones Rash    Sulfa (Sulfonamide Antibiotics) Rash      Social History     Tobacco Use    Smoking status: Former Smoker    Smokeless tobacco: Never Used   Substance Use Topics    Alcohol use: Yes     Alcohol/week: 0.0 standard drinks      No family history on file. Review of Systems:     A comprehensive review of systems was negative except for that written in the HPI. Objective:   Vital Signs:  Visit Vitals  /77 (BP 1 Location: Left upper arm, BP Patient Position: At rest)   Pulse 62   Temp 97.7 °F (36.5 °C)   Resp 14   Ht 5' 3\" (1.6 m)   Wt 67.4 kg (148 lb 9.4 oz)   SpO2 98%   Breastfeeding No   BMI 26.32 kg/m²      O2 Device: None (Room air) Temp (24hrs), Av °F (36.7 °C), Min:97.7 °F (36.5 °C), Max:98.4 °F (36.9 °C)           Intake/Output:     Intake/Output Summary (Last 24 hours) at 2022 0711  Last data filed at 2022 0700  Gross per 24 hour   Intake 745.13 ml   Output --   Net 745.13 ml     Physical Exam:  General:  alert, cooperative, no distress, appears stated age  Neurologic:  oriented, RIVERO, no unilateral weakness noted. Lungs:  clear to auscultation bilaterally  Heart:  regular rate and rhythm, S1, S2 normal, no murmur, click, rub or gallop  Abdomen:  soft, non-tender.  Bowel sounds normal. No masses,  no organomegaly  Cardiovascular:  Regular rate and rhythm, S1S2 present, without murmur or extra heart sounds, pedal pulses normal and no edema  Skin:  Normal.    LABS AND  DATA: Personally reviewed  Recent Labs     22  1626   WBC 4.6   HGB 11.3*   HCT 36.0        Recent Labs     22  1626      K 3.6      CO2 28   BUN 18   CREA 0.86   *   CA 9.6     Recent Labs     22  1626   AP 64   TP 7.6   ALB 3.8   GLOB 3.8     Recent Labs     22  1626   INR 1.0   PTP 10.5      No results for input(s): PHI, PCO2I, PO2I, FIO2I in the last 72 hours. No results for input(s): CPK, CKMB, TROIQ, BNPP in the last 72 hours. Hemodynamics:   PAP:   CO:     Wedge:   CI:     CVP:    SVR:       PVR:       Ventilator Settings:  Mode Rate Tidal Volume Pressure FiO2 PEEP                    Peak airway pressure:      Minute ventilation:          MEDS: Reviewed    Chest X-Ray: personally reviewed and report checked  CXR Results  (Last 48 hours)    None        ECHO pending     Assessment:   Suspected CVA: Left sided weakness and sensory changes   Hypertension     ICU Comprehensive Plan of Care:     Multidisciplinary Rounds Completed: Yes    ABCDEF Bundle/Checklist  Pain Medications: None  Target RASS: 0 - Alert & Calm - Spontaneously pays attention to caregiver  Sedation Medications: None  CAM-ICU:  Negative  Mobility: Good   PT/OT: PT consulted and on board, OT consulted and on board and Speech therapy consulted and on board   Restraints: None needed at this time  Discussed Plan of Care (goals of care): Yes  Addressed Code Status: Full Code    NEUROLOGY  Neuro checks per stroke protocol. Neurology consulted  Statin  MRI or CT head 24 hours post TPA     CARDIOVASCULAR  Echo ordered  Hold home B/P med - allow permissive HTN first 24 hrs post CVA  Cardiac Gtts: None  SBP Goal of: < 180 mmHg  MAP Goal of: > 65 mmHg  Transfusion Trigger (Hgb): <7 g/dL    RESPIRATORY  Vent Goals:    Incentive spirometry  DVT Prophylaxis (if no, list reason): SCD's or Sequential Compression Device   SPO2 Goal: > 92%  Pulmonary toilet: Incentive Spirometry     GI/  Villanueva Catheter Present: No  GI Prophylaxis: Not at this time   Nutrition: Yes   IVFs: LR @ 50/hr 24 hrs  Bowel Movement: No  Bowel Regimen: None needed at this time  Insulin: none     ANTIBIOTICS  Antibiotics:  none    T/L/D  Tubes: None  Lines: Peripheral IV  Drains: None    SPECIAL EQUIPMENT  None    DISPOSITION  Stay in ICU may transfer to floor when OK with neurology and 24 hrs post TPA    CRITICAL CARE CONSULTANT NOTE  I had a face to face encounter with the patient, reviewed and interpreted patient data including clinical events, labs, images, vital signs, I/O's, and examined patient. I have discussed the case and the plan and management of the patient's care with the consulting services, the bedside nurses and the respiratory therapist.      NOTE OF PERSONAL INVOLVEMENT IN CARE   This patient has a high probability of imminent, clinically significant deterioration, which requires the highest level of preparedness to intervene urgently. I participated in the decision-making and personally managed or directed the management of the following life and organ supporting interventions that required my frequent assessment to treat or prevent imminent deterioration. I personally spent 35 minutes of critical care time. This is time spent at this critically ill patient's bedside actively involved in patient care as well as the coordination of care and discussions with the patient's family. This does not include any procedural time which has been billed separately.     Belén Pace AGACNP-BC     [address] [facility]

## 2022-02-21 NOTE — PROGRESS NOTES
Spiritual Care Assessment/Progress Note  [facility]      NAME: Bushra Finch      MRN: [id number]  AGE: 61 y.o.  SEX: female  Zoroastrianism Affiliation: Cheondoism   Language: English     2/21/2022     Total Time (in minutes): 80     Spiritual Assessment begun in [address] [facility] through conversation with:         [x]Patient        [] Family    [] Friend(s)        Reason for Consult: Advance medical directive consult,Initial/Spiritual assessment, patient floor     Spiritual beliefs: (Please include comment if needed)     [x] Identifies with a odalis tradition: Episcopalian        [] Supported by a odalis community:            [] Claims no spiritual orientation:           [] Seeking spiritual identity:                [] Adheres to an individual form of spirituality:           [] Not able to assess:                           Identified resources for coping:      [x] Prayer                               [] Music                  [] Guided Imagery     [x] Family/friends                 [] Pet visits     [] Devotional reading                         [] Unknown     [] Other:                                               Interventions offered during this visit: (See comments for more details)    Patient Interventions: Advance medical directive completed,Advance medical directive consult,Affirmation of emotions/emotional suffering,Affirmation of odalis,Coping skills reviewed/reinforced,Guided imagery,Initial/Spiritual assessment, Critical care,Life review/legacy,Issues around forgiveness/closure,Reframing           Plan of Care:     [x] Support spiritual and/or cultural needs    [x] Support AMD and/or advance care planning process      [x] Support grieving process   [] Coordinate Rites and/or Rituals    [] Coordination with community clergy   [] No spiritual needs identified at this time   [] Detailed Plan of Care below (See Comments)  [] Make referral to Music Therapy  [] Make referral to Pet Therapy     [] Make referral to Addiction services  [] Make referral to [facility]  [] Make referral to Spiritual Care Partner  [] No future visits requested        [] Contact Spiritual Care for further referrals     Comments: In-basket request for AMD in Room 7101. Patient was lying in bed on the phone. She ended her call and invited  in. Kenzie's father was in [place], so she moved around during her childhood years. She talked about her rich experiences of traveling. Kenzie shared about her experience that brought her to the hospital and how it was a scary experience. She was happy to have her daughter Garrett Velázquez with her. Kenzie has 3 adult children: MERI-[place] 35 yo, Pauly 28 yo, and a son Jenni Vidales 27 yo and 3 grand. 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 Prayer and odalis are her coping resources, as well as motorcycle riding in the spring and spending quality with her dog. Facilitated and discussion on Advance Directives and educated Kenzie about documentation. During discussion of AMD some family dynamics arose around SEVRAN son Jenni Vidales which caused her to become emotional reactions. After some discussion, advance directive completed in alignment with Edmund's values and beliefs.  addressed all questions and concerns. Facilitated completion of advance directive, life review and reframing of experiences; Explored spiritual, emotional, and relational needs; Provided anxiety containment, empathic listening; and Cultivated a relationship of trust, compassion, and care. Advised of  availability. Kenzie verbally and emotionally expressed her appreciation for the support. Visited by: Trish Mclaughlin.  Richelle Marin, [address] [place] [address] paging Service [phone])

## 2022-02-21 NOTE — CONSULTS
[address] [facility] Cardiology Consultation    Date of Consult:  02/21/22  Date of Admission: 2/20/2022  Primary Cardiologist: None  Physician Requesting consult: Dr. Tran Cruz    Chief Complaint / Reason for Consult:   Palpitations, stroke    History of Present Illness:  Ayaka Collado is a 61 y.o. female with the below listed medical history who was admitted with acute left hemiparesis/hemisensory defect and was found to have possible posterior circulation TIA versus small infarction. Received tPA. Left-sided weakness is markedly improved. She reports occasional palpitations and LH. No syncope. Denies undue COREAS, CP, prior arrhythmia or AF. Says palpitations have been present for several months. Had prior right knee arthroscopy, prior partial hysterectomy and also prior hernia repair. Denies h/o DVT/PE. No FHx of thrombotic or bleeding complications. Past Medical History:   Diagnosis Date    Hyperlipidemia        Prior to Admission medications    Medication Sig Start Date End Date Taking? Authorizing Provider   ascorbic acid (VITAMIN C PO) Take 1 Tablet by mouth daily. Yes Provider, Historical   ZINC PO Take 1 Tablet by mouth daily. Yes Provider, Historical   cholecalciferol, vitamin D3, (VITAMIN D3 PO) Take 1 Tablet by mouth daily. Yes Provider, Historical   losartan-hydroCHLOROthiazide (HYZAAR) 50-12.5 mg per tablet Take 1 Tablet by mouth daily. Yes Provider, Historical   omega-3 fatty acids cap Take 1 Caplet by mouth daily.    Yes Provider, Historical       Current Facility-Administered Medications   Medication Dose Route Frequency    [Held by provider] losartan/hydroCHLOROthiazide (HYZAAR) 50/12.5 mg   Oral DAILY    lactated Ringers infusion  50 mL/hr IntraVENous CONTINUOUS    labetaloL (NORMODYNE;TRANDATE) injection 10 mg  10 mg IntraVENous Q5MIN PRN    ondansetron (ZOFRAN) injection 4 mg  4 mg IntraVENous Q6H PRN    atorvastatin (LIPITOR) tablet 80 mg  80 mg Oral QHS No pertinent CV family history. Social History     Socioeconomic History    Marital status:      Spouse name: Not on file    Number of children: Not on file    Years of education: Not on file    Highest education level: Not on file   Occupational History    Not on file   Tobacco Use    Smoking status: Former Smoker    Smokeless tobacco: Never Used   Substance and Sexual Activity    Alcohol use: Yes     Alcohol/week: 0.0 standard drinks    Drug use: No    Sexual activity: Not on file   Other Topics Concern    Not on file   Social History Narrative    ** Merged History Encounter **          Social Determinants of Health     Financial Resource Strain:     Difficulty of Paying Living Expenses: Not on file   Food Insecurity:     Worried About Running Out of Food in the Last Year: Not on file    Miley of Food in the Last Year: Not on file   Transportation Needs:     Lack of Transportation (Medical): Not on file    Lack of Transportation (Non-Medical):  Not on file   Physical Activity:     Days of Exercise per Week: Not on file    Minutes of Exercise per Session: Not on file   Stress:     Feeling of Stress : Not on file   Social Connections:     Frequency of Communication with Friends and Family: Not on file    Frequency of Social Gatherings with Friends and Family: Not on file    Attends Nondenominational Services: Not on file    Active Member of [address] [place] [address] [employer] or Organizations: Not on file    Attends Club or Organization Meetings: Not on file    Marital Status: Not on file   Intimate Partner Violence:     Fear of Current or Ex-Partner: Not on file    Emotionally Abused: Not on file    Physically Abused: Not on file    Sexually Abused: Not on file   Housing Stability:     Unable to Pay for Housing in the Last Year: Not on file    Number of [place] in the Last Year: Not on file    Unstable Housing in the Last Year: Not on file       Review of Systems   All other systems reviewed and are negative.       Visit Vitals  /85   Pulse 82   Temp 97.8 °F (36.6 °C)   Resp 17   Ht 5' 3\" (1.6 m)   Wt 67 kg (147 lb 11.3 oz)   SpO2 98%   Breastfeeding No   BMI 26.17 kg/m²         Intake/Output Summary (Last 24 hours) at 2/21/2022 1610  Last data filed at 2/21/2022 1200  Gross per 24 hour   Intake 948.46 ml   Output --   Net 948.46 ml        Physical Exam  GEN: NAD, appears stated age  [de-identified]: EOMI, MMM, OP clear  NECK: Normal JVP, carotids 2+ b/l and symmetrical  CV: RRR, normal S1 and S2, no M/R/G  LUNGS: CTAB, no W/R/R  ABD: NABS, soft, NT/ND  EXT: No edema, 2+ and symmetrical radial pulses b/l  PSYCH: Mood and affect normal  NEURO: AAO, MAEW, face symmetrical, speech intact    Lab Review:  BMP:   Lab Results   Component Value Date/Time     02/20/2022 04:26 PM    K 3.6 02/20/2022 04:26 PM     02/20/2022 04:26 PM    CO2 28 02/20/2022 04:26 PM    AGAP 6 02/20/2022 04:26 PM     (H) 02/20/2022 04:26 PM    BUN 18 02/20/2022 04:26 PM    CREA 0.86 02/20/2022 04:26 PM    GFRAA >60 02/20/2022 04:26 PM    GFRNA >60 02/20/2022 04:26 PM        CBC:  Lab Results   Component Value Date/Time    WBC 4.6 02/20/2022 04:26 PM    HGB 11.3 (L) 02/20/2022 04:26 PM    HCT 36.0 02/20/2022 04:26 PM    PLATELET 842 06/66/3821 04:26 PM    MCV 90.0 02/20/2022 04:26 PM       All Cardiac Markers in the last 24 hours:  No results found for: CPK, CK, CKMMB, CKMB, RCK3, CKMBT, CKMBPOC, CKNDX, CKND1, ALLIE, TROPT, TROIQ, SAKINA, TROPT, TNIPOC, BNP, BNPP, BNPNT    Lab Results   Component Value Date/Time    Cholesterol, total 241 (H) 02/20/2022 04:26 PM    HDL Cholesterol 49 02/20/2022 04:26 PM    LDL, calculated 164 (H) 02/20/2022 04:26 PM    VLDL, calculated 28 02/20/2022 04:26 PM    Triglyceride 140 02/20/2022 04:26 PM    CHOL/HDL Ratio 4.9 02/20/2022 04:26 PM        Data Review:  ECG tracing personally reviewed:   NSR, borderline LVH, LAD    Echocardiogram:  02/20/22    ECHO ADULT COMPLETE 02/21/2022 2/21/2022    Interpretation Summary    Left Ventricle: Left ventricle size is normal. Mildly increased wall thickness. Normal wall motion. Normal left ventricular systolic function with a visually estimated EF of 55 - 60%. Normal diastolic function.   Interatrial Septum: Interatrial septum was not well visualized. Agitated saline study was negative with and without provocation. Signed by: Albert Castillo MD on 2/21/2022  2:20 PM    I personally reviewed the echo, agree with above interpretation; bubble study was only average quality with no evidence for interatrial shunt. Unclear if Valsalva or other provocative maneuvers were done. Other cardiac testing: N/A    Other imaging:  CTA head:  IMPRESSION  No acute findings. Assessment:    1. Likely acute posterior circulation stroke versus TIA  2. Palpitations  3. LVH  4. HTN  5. Dyslipidemia   - , , HDL 49,   6. Former smoker  7. Possible diabetes mellitus, type 2   - HbA1c 6.6%  8. Mild anemia, normocytic    Recommendations / Plan:  - Inadequate evaluation for PFO given no provacative maneuvers with TTE; recommend RONNIE  - Will arrange 30 day outpatient Holter monitor (MCOT) to evaluate for occult atrial fibrillation  - Suspect current dizziness is related to posterior circulation stroke; prior dizziness was likely related to uncontrolled HTN  - BP control per neurology in the acute post-stroke period  - Antiplatelet and statin therapy per neurology  - Will arrange outpatient RONNIE and MCOT as above  - Discussed with Dr. Sneha Muhammad  - Discussed with patient who was seen in MRI waiting     Thank you for the opportunity to participate in the care of Kenzie BrockAlli and please do not hesitate to contact us should you have any questions. Signed:  Chayito Valles, [address] [place] [address] [facility]  02/21/22

## 2022-02-21 NOTE — PROGRESS NOTES
PHYSICAL THERAPY EVALUATION WITH DISCHARGE  Patient: Jimmy Martinez (57 y.o. female)  Date: 2/21/2022  Primary Diagnosis: CVA (cerebral vascular accident) [facility]) [I63.9]       Precautions:          ASSESSMENT  Based on the objective data described below, the patient presents with reports of L sided weakness and admitted for CVA work up and received TPA on 2/20. Pt received supine in bed and agreeable to therapy. Pt tolerated session well. Pt demonstrated equal and 5/5 LE strength and intact sensation and coordination. Pt completed supine to sit and transfers independently, gait training x 350 ft without AD with supervision. Pt able to complete head turns in all directions without LOB or instability. Pt does not require any further acute PT needs or follow up post discharge. .    Functional Outcome Measure: The patient scored Total: 56/56 on the Lopez Balance Assessment which is indicative of low fall risk. Other factors to consider for discharge: none     Further skilled acute physical therapy is not indicated at this time. PLAN :  Recommendation for discharge: (in order for the patient to meet his/her long term goals)  No skilled physical therapy/ follow up rehabilitation needs identified at this time. This discharge recommendation:  Has been made in collaboration with the attending provider and/or case management    IF patient discharges home will need the following DME: none         SUBJECTIVE:   Patient stated [place]     OBJECTIVE DATA SUMMARY:   HISTORY:    Past Medical History:   Diagnosis Date    Hyperlipidemia    No past surgical history on file.     Prior level of function: independent, ambulatory without AD, lives alone, supportive family lives locally, works as a  for [employer] factors and/or comorbidities impacting plan of care:     [address] [place] [address] Environment: Private residence  # Steps to Enter: 5  Rails to Enter: No  One/Two Story Residence: Other (Comment) (tri-level w/ basement--washer & dryer there)  # of Interior Steps: 14  Interior Rails: Right  Living Alone: Yes  Support Systems: Child(prisca),Other Family Member(s)  Patient Expects to be Discharged to[de-identified] Home  Current DME Used/Available at Home: Crutches  Tub or Shower Type: Tub/Shower combination    EXAMINATION/PRESENTATION/DECISION MAKING:   Critical Behavior:  Neurologic State: Alert  Orientation Level: Oriented X4  Cognition: Appropriate safety awareness,Appropriate decision making,Appropriate for age attention/concentration,Follows commands  Safety/Judgement: Awareness of environment,Fall prevention,Good awareness of safety precautions,Insight into deficits,Home safety  Hearing: Auditory  Auditory Impairment: None  Skin:   Edema:   Range Of Motion:  AROM: Within functional limits           PROM: Within functional limits           Strength:    Strength: Within functional limits                    Tone & Sensation:   Tone: Normal              Sensation: Intact               Coordination:  Coordination: Within functional limits  Vision:   Tracking: Able to track stimulus in all quadrants w/o difficulty  Diplopia: No  Acuity: Within Defined Limits  Corrective Lenses: Glasses  Functional Mobility:  Bed Mobility:     Supine to Sit: Independent     Scooting: Independent  Transfers:  Sit to Stand: Supervision  Stand to Sit: Supervision                       Balance:   Sitting: Intact  Standing: Intact; Without support  Ambulation/Gait Training:  Distance (ft): 350 Feet (ft)  Assistive Device: Gait belt  Ambulation - Level of Assistance: Supervision        Gait Abnormalities:  (guarded posture)        Base of Support: Narrowed     Speed/Yuki: Pace decreased (<100 feet/min)          Functional Measure  Lopez Balance Test:    Sitting to Standin  Standing Unsupported: 4  Sitting with Back Unsupported: 4  Standing to Sittin  Transfers: 4  Standing Unsupported with Eyes Closed: 4  Standing Unsupported with Feet Together: 4  Reach Forward with Outstretched Arm: 4   Object: 4  Turn to Look Over Shoulders: 4  Turn 360 Degrees: 4  Alternate Foot on Step/Stool: 4  Standing Unsupported One Foot in Front: 4  Stand on One Le  Total: 56/56         56=Maximum possible score;   0-20=High fall risk  21-40=Moderate fall risk   41-56=Low fall risk         Based on the above components, the patient evaluation is determined to be of the following complexity level: LOW     Pain Rating:  Pt denied pain    Activity Tolerance:   Good    After treatment patient left in no apparent distress:   Sitting in chair and Call bell within reach    COMMUNICATION/EDUCATION:   The patients plan of care was discussed with: Occupational therapist and Registered nurse. Patient was educated regarding Her deficit(s) of L sided weakness (resolved s/p TPA) as this relates to Her diagnosis of CVA work up. She demonstrated Good understanding. Patient and/or family was verbally educated on the BE FAST acronym for signs/symptoms of CVA and TIA. BE FAST was written on patient's communication board  for visual education and reinforcement. All questions answered with patient indicating good understanding. Fall prevention education was provided and the patient/caregiver indicated understanding., Patient/family have participated as able in goal setting and plan of care. and Patient/family agree to work toward stated goals and plan of care.     Thank you for this referral.  Soha Mayorga, PT, DPT   Time Calculation: 16 mins

## 2022-02-21 NOTE — PROGRESS NOTES
OCCUPATIONAL THERAPY EVALUATION/DISCHARGE  Patient: Fransisco Dunlap (57 y.o. female)  Date: 2/21/2022  Primary Diagnosis: CVA (cerebral vascular accident) [facility]) [I63.9]       Precautions:        ASSESSMENT  Based on the objective data described below, the patient presents with return to baseline function s/p admission for L sided weakness and altered sensation, underwent CVA workup s/p tPA administration 2/20/22. At baseline, she is IND, working, driving, and living alone in a tri-level home with family nearby. She now presents close to her baseline, largely IND-SPV for ADLs and functional mobility. Patient initially cautious with activity, however progressed with improved comfort, reports complete resolution of L sided weakness, LUE/LLE sensation intact. Reviewed BE FAST and home safety, no further acute OT needs. Current Level of Function (ADLs/self-care): IND-SPV for ADLs and mobility    Functional Outcome Measure: The patient scored Total A-D  Total A-D (Motor Function): 66/66 on the Fugl-Gr Assessment which is indicative of no impairment in upper extremity functional status. Other factors to consider for discharge: high PLOF, resolution of neuro deficits, s/p tPA     PLAN :  Recommendation for discharge: (in order for the patient to meet his/her long term goals)  No skilled occupational therapy/ follow up rehabilitation needs identified at this time. This discharge recommendation:  A follow-up discussion with the attending provider and/or case management is planned    IF patient discharges home will need the following DME: none       SUBJECTIVE:   Patient stated I feel better than I did yesterday.     OBJECTIVE DATA SUMMARY:   HISTORY:   Past Medical History:   Diagnosis Date    Hyperlipidemia    No past surgical history on file.     Prior Level of Function/Environment/Context:   Expanded or extensive additional review of patient history:     Home Situation  Home Environment: Private residence  # Steps to Enter: 5  Rails to [employer]: No  One/Two Story Residence: Other (Comment) (tri-level w/ basement--washer & dryer there)  # of Interior Steps: 14  Interior Rails: Right  Living Alone: Yes  Support Systems: Child(prisca),Other Family Member(s)  Patient Expects to be Discharged to[de-identified] Home  Current DME Used/Available at Home: Crutches  Tub or Shower Type: Tub/Shower combination    Hand dominance: Right    EXAMINATION OF PERFORMANCE DEFICITS:  Cognitive/Behavioral Status:  Neurologic State: Alert  Orientation Level: Oriented X4  Cognition: Appropriate safety awareness; Appropriate decision making; Appropriate for age attention/concentration; Follows commands  Perception: Appears intact  Perseveration: No perseveration noted  Safety/Judgement: Awareness of environment; Fall prevention;Good awareness of safety precautions; Insight into deficits;Home safety    Skin: Appears intact    Edema: None    Hearing: Auditory  Auditory Impairment: None    Vision/Perceptual:    Tracking: Able to track stimulus in all quadrants w/o difficulty                 Diplopia: No    Acuity: Within Defined Limits    Corrective Lenses: Glasses    Range of Motion:  AROM: Within functional limits  PROM: Within functional limits                      Strength:  Strength: Within functional limits                Coordination:  Coordination: Within functional limits  Fine Motor Skills-Upper: Left Intact; Right Intact    Gross Motor Skills-Upper: Left Intact; Right Intact    Tone & Sensation:  Tone: Normal  Sensation: Intact                      Balance:  Sitting: Intact  Standing: Intact; Without support    Functional Mobility and Transfers for ADLs:  Bed Mobility:  Supine to Sit: Independent  Scooting: Independent    Transfers:  Sit to Stand: Supervision  Stand to Sit: Supervision  Bathroom Mobility: Supervision/set up  Toilet Transfer : Supervision  Assistive Device : Gait Belt    ADL Assessment:  Feeding: Independent    Oral Facial Hygiene/Grooming: Supervision    Bathing: Supervision    Upper Body Dressing: Independent    Lower Body Dressing: Supervision    Toileting: Supervision                ADL Intervention and task modifications:     Lower Body Dressing Assistance  Dressing Assistance: Supervision  Pants With Elastic Waist: Supervision (simulated)  Socks: Independent  Leg Crossed Method Used: Yes  Position Performed: Seated edge of bed;Standing      Patient instructed and indicated understanding home modifications (ie raise height of ADL objects, appropriate chair heights, recliner safety), safety (ie change of floor surfaces, clear pathways), to increase independence and fall prevention, acknowledging understanding. Cognitive Retraining  Safety/Judgement: Awareness of environment; Fall prevention;Good awareness of safety precautions; Insight into deficits;Home safety      Functional Measure:  Fugl-Gr Assessment of Motor Recovery after Stroke:   Reflex Activity  Flexors/Biceps/Fingers: Can be elicited  Extensors/Triceps: Can be elicited  Reflex Subtotal: 4    Volitional Movement Within Synergies  Shoulder Retraction: Full  Shoulder Elevation: Full  Shoulder Abduction (90 degrees): Full  Shoulder External Rotation: Full  Elbow Flexion: Full  Forearm Supination: Full  Shoulder Adduction/Internal Rotation: Full  Elbow Extension: Full  Forearm Pronation: Full  Subtotal: 18    Volitional Movement Mixing Synergies  Hand to Lumbar Spine: Full  Shoulder Flexion (0-90 degrees): Full  Pronation-Supination: Full  Subtotal: 6    Volitional Movement With Little or No Synergy  Shoulder Abduction (0-90 degrees): Full  Shoulder Flexion ( degrees): Full  Pronation/Supination: Full  Subtotal : 6    Normal Reflex Activity  Biceps, Triceps, Finger Flexors:  Full  Subtotal : 2    Upper Extremity Total   Upper Extremity Total: 36    Wrist  Stability at 15 Degree Dorsiflexion: Full  Repeated Dorsiflexion/ Volar Flexion: Full  Stability at 15 Degree Dorsiflexion: Full  Repeated Dorsiflexion/ Volar Flexion: Full  Circumduction: Full  Wrist Total: 10    Hand  Mass Flexion: Full  Mass Extension: Full  Grasp A: Full  Grasp B: Full  Grasp C: Full  Grasp D: Full  Grasp E: Full  Hand Total: 14    Coordination/Speed  Tremor: None  Dysmetria: None  Time: <1s  Coordination/Speed Total : 6    Total A-D  Total A-D (Motor Function): /66     This is a reliable/valid measure of arm function after a neurological event. It has established value to characterize functional status and for measuring spontaneous and therapy-induced recovery; tests proximal and distal motor functions. Fugl-Gr Assessment - UE scores recorded between five and 30 days post neurologic event can be used to predict UE recovery at six months post neurologic event. Severe = 0-21 points   Moderately Severe = 22-33 points   Moderate = 34-47 points   Mild = 48-66 points  FANTA García, LEIDY Alexandra, & PETE Patterson (1992). Measurement of motor recovery after stroke: Outcome assessment and sample size requirements.  Stroke, 23, pp. 5554-2796.   ------------------------------------------------------------------------------------------------------------------------------------------------------------------  MCID:  Stroke:   Kathy Mora et al, 2001; n = 171; mean age 79 (5) years; assessed within 16 (12) days of stroke, Acute Stroke)  FMA Motor Scores from Admission to Discharge   10 point increase in FMA Upper Extremity = 1.5 change in discharge FIM   10 point increase in FMA Lower Extremity = 1.9 change in discharge FIM  MDC:   Stroke:   Tereza Lux et al, 2008, n = 14, mean age = 59.9 (14.6) years, assessed on average 14 (6.5) months post stroke, Chronic Stroke)   FMA = 5.2 points for the Upper Extremity portion of the assessment       Barthel Index:  Bathin  Bladder: 10  Bowels: 10  Groomin  Dressing: 10  Feeding: 10  Mobility: 15  Stairs: 10  Toilet Use: 10  Transfer (Bed to Chair and Back): 10  Total: 95/100      The Barthel ADL Index: Guidelines  1. The index should be used as a record of what a patient does, not as a record of what a patient could do. 2. The main aim is to establish degree of independence from any help, physical or verbal, however minor and for whatever reason. 3. The need for supervision renders the patient not independent. 4. A patient's performance should be established using the best available evidence. Asking the patient, friends/relatives and nurses are the usual sources, but direct observation and common sense are also important. However direct testing is not needed. 5. Usually the patient's performance over the preceding 24-48 hours is important, but occasionally longer periods will be relevant. 6. Middle categories imply that the patient supplies over 50 per cent of the effort. 7. Use of aids to be independent is allowed. Score Interpretation (from [address] [facility] 83)    Independent   60-79 Minimally independent   40-59 Partially dependent   20-39 Very dependent   <20 Totally dependent     -Charline Shepherd., Barthel, D.W. (1965). Functional evaluation: the Barthel Index. [facility] ([address] [place] [address] (1997). The Barthel activities of daily living index: self-reporting versus actual performance in the old (> or = 75 years). Journal [address] 45(7), 14 [facility], ..., Obdulia Victor., Romana Just. (1999). Measuring the change in disability after inpatient rehabilitation; comparison of the responsiveness of the Barthel Index and Functional [place] Measure. Journal of Neurology, Neurosurgery, and Psychiatry, 66(4), 658-774. AMELIA Alonso.ALLY, RENETTA West, & Melany Roberts MYuniA. (2004) Assessment of post-stroke quality of life in cost-effectiveness studies: The usefulness of the Barthel Index and the EuroQoL-5D.  Quality of Life Research, 13, 397-62         Occupational Therapy Evaluation Charge Determination   History Examination Decision-Making   LOW Complexity : Brief history review  LOW Complexity : 1-3 performance deficits relating to physical, cognitive , or psychosocial skils that result in activity limitations and / or participation restrictions  LOW Complexity : No comorbidities that affect functional and no verbal or physical assistance needed to complete eval tasks       Based on the above components, the patient evaluation is determined to be of the following complexity level: LOW   Pain Rating:  None    Activity Tolerance: WNL    After treatment patient left in no apparent distress:    Sitting in chair and Call bell within reach    COMMUNICATION/EDUCATION:   The patients plan of care was discussed with: Physical therapist and Registered nurse. Patient was educated regarding her deficit(s) of resolved L sided weakness and altered sensation as this relates to her diagnosis of CVA. She demonstrated Good understanding as evidenced by verbal discussion & carryover. Patient and/or family was verbally educated on the BE FAST acronym for signs/symptoms of CVA and TIA. BE FAST was written on patient's communication board  for visual education and reinforcement. All questions answered with patient indicating good understanding.      Thank you for this referral.  WILLIAM Fleming, OTR/L  Time Calculation: 14 mins

## 2022-02-22 LAB
ANION GAP SERPL CALC-SCNC: 4 MMOL/L (ref 5–15)
BUN SERPL-MCNC: 14 MG/DL (ref 6–20)
BUN/CREAT SERPL: 18 (ref 12–20)
CALCIUM SERPL-MCNC: 9.5 MG/DL (ref 8.5–10.1)
CHLORIDE SERPL-SCNC: 106 MMOL/L (ref 97–108)
CO2 SERPL-SCNC: 28 MMOL/L (ref 21–32)
CREAT SERPL-MCNC: 0.76 MG/DL (ref 0.55–1.02)
ERYTHROCYTE [DISTWIDTH] IN BLOOD BY AUTOMATED COUNT: 13.3 % (ref 11.5–14.5)
GLUCOSE SERPL-MCNC: 110 MG/DL (ref 65–100)
HCT VFR BLD AUTO: 33.5 % (ref 35–47)
HGB BLD-MCNC: 10.6 G/DL (ref 11.5–16)
MAGNESIUM SERPL-MCNC: 2.1 MG/DL (ref 1.6–2.4)
MCH RBC QN AUTO: 28.3 PG (ref 26–34)
MCHC RBC AUTO-ENTMCNC: 31.6 G/DL (ref 30–36.5)
MCV RBC AUTO: 89.6 FL (ref 80–99)
NRBC # BLD: 0 K/UL (ref 0–0.01)
NRBC BLD-RTO: 0 PER 100 WBC
PHOSPHATE SERPL-MCNC: 4 MG/DL (ref 2.6–4.7)
PLATELET # BLD AUTO: 240 K/UL (ref 150–400)
PMV BLD AUTO: 11.2 FL (ref 8.9–12.9)
POTASSIUM SERPL-SCNC: 3.8 MMOL/L (ref 3.5–5.1)
RBC # BLD AUTO: 3.74 M/UL (ref 3.8–5.2)
SODIUM SERPL-SCNC: 138 MMOL/L (ref 136–145)
WBC # BLD AUTO: 4.3 K/UL (ref 3.6–11)

## 2022-02-22 PROCEDURE — 84100 ASSAY OF PHOSPHORUS: CPT

## 2022-02-22 PROCEDURE — 74011250637 HC RX REV CODE- 250/637: Performed by: NURSE PRACTITIONER

## 2022-02-22 PROCEDURE — 85027 COMPLETE CBC AUTOMATED: CPT

## 2022-02-22 PROCEDURE — 74011250636 HC RX REV CODE- 250/636: Performed by: FAMILY MEDICINE

## 2022-02-22 PROCEDURE — 80048 BASIC METABOLIC PNL TOTAL CA: CPT

## 2022-02-22 PROCEDURE — 92610 EVALUATE SWALLOWING FUNCTION: CPT

## 2022-02-22 PROCEDURE — 74011250636 HC RX REV CODE- 250/636: Performed by: NURSE PRACTITIONER

## 2022-02-22 PROCEDURE — 77030040361 HC SLV COMPR DVT MDII -B

## 2022-02-22 PROCEDURE — 36415 COLL VENOUS BLD VENIPUNCTURE: CPT

## 2022-02-22 PROCEDURE — 65660000000 HC RM CCU STEPDOWN

## 2022-02-22 PROCEDURE — 99233 SBSQ HOSP IP/OBS HIGH 50: CPT | Performed by: PSYCHIATRY & NEUROLOGY

## 2022-02-22 PROCEDURE — 94760 N-INVAS EAR/PLS OXIMETRY 1: CPT

## 2022-02-22 PROCEDURE — 83735 ASSAY OF MAGNESIUM: CPT

## 2022-02-22 RX ORDER — MECLIZINE HCL 12.5 MG 12.5 MG/1
12.5 TABLET ORAL
Status: DISCONTINUED | OUTPATIENT
Start: 2022-02-22 | End: 2022-02-23 | Stop reason: HOSPADM

## 2022-02-22 RX ORDER — ATORVASTATIN CALCIUM 80 MG/1
80 TABLET, FILM COATED ORAL
Qty: 90 TABLET | Refills: 1 | Status: SHIPPED | OUTPATIENT
Start: 2022-02-22

## 2022-02-22 RX ORDER — GUAIFENESIN 100 MG/5ML
81 LIQUID (ML) ORAL DAILY
Qty: 90 TABLET | Refills: 1 | Status: SHIPPED | OUTPATIENT
Start: 2022-02-23 | End: 2022-08-26

## 2022-02-22 RX ADMIN — ASPIRIN 81 MG 81 MG: 81 TABLET ORAL at 08:34

## 2022-02-22 RX ADMIN — MECLIZINE 12.5 MG: 12.5 TABLET ORAL at 16:54

## 2022-02-22 RX ADMIN — ONDANSETRON HYDROCHLORIDE 4 MG: 2 SOLUTION INTRAMUSCULAR; INTRAVENOUS at 10:41

## 2022-02-22 RX ADMIN — LOSARTAN POTASSIUM: 50 TABLET, FILM COATED ORAL at 08:34

## 2022-02-22 RX ADMIN — ATORVASTATIN CALCIUM 80 MG: 40 TABLET, FILM COATED ORAL at 21:26

## 2022-02-22 RX ADMIN — ACETAMINOPHEN 650 MG: 325 TABLET ORAL at 14:55

## 2022-02-22 NOTE — DISCHARGE SUMMARY
Hospitalist Discharge Summary     Patient ID:  Mya Valero  [id number]  79 y.o.  1965 2/20/2022    PCP on record: Sue Pichardo NP    Admit date: 2/20/2022  Discharge date and time: 2/22/2022    DISCHARGE DIAGNOSIS:  ·  right putamen acute-subacute infarct with presumed etiology likely due to small vessel disease. CONSULTATIONS:  IP CONSULT TO NEUROLOGY  IP CONSULT TO CARDIOLOGY    Excerpted HPI from H&P of Fabiola Douglas MD:  Patient is a 61year old female with a hx of HTN who presents w sudden onset of left upper and lower extremity weakness, left sided sensory changes and difficulty ambulating X 45 minutes. NIH at the time of my exam is 2. Patient has a past medication history of hyperlipidemia and HTN. No other past medical history. Currently at time of exam she is complaining of left sided weakness, mild nausea, light headedness and some left arm and leg numbness. ______________________________________________________________________  DISCHARGE SUMMARY/HOSPITAL COURSE:  for full details see H&P, daily progress notes, labs, consult notes. _______________________________________________________________________  Patient seen and examined by me on discharge day. Pertinent Findings:  Gen:    Not in distress  Chest: Clear lungs  CVS:   Regular rhythm. No edema  Abd:  Soft, not distended, not tender  Neuro:  Alert, ORIENTED X4  _______________________________________________________________________  DISCHARGE MEDICATIONS:   Current Discharge Medication List      START taking these medications    Details   aspirin 81 mg chewable tablet Take 1 Tablet by mouth daily. Qty: 90 Tablet, Refills: 1  Start date: 2/23/2022      atorvastatin (LIPITOR) 80 mg tablet Take 1 Tablet by mouth nightly.   Qty: 90 Tablet, Refills: 1  Start date: 2/22/2022         CONTINUE these medications which have NOT CHANGED    Details   ascorbic acid (VITAMIN C PO) Take 1 Tablet by mouth daily.      ZINC PO Take 1 Tablet by mouth daily. cholecalciferol, vitamin D3, (VITAMIN D3 PO) Take 1 Tablet by mouth daily. losartan-hydroCHLOROthiazide (HYZAAR) 50-12.5 mg per tablet Take 1 Tablet by mouth daily. omega-3 fatty acids cap Take 1 Caplet by mouth daily. Patient Follow Up Instructions:    Activity: Activity as tolerated  Diet: Cardiac Diet  Wound Care: None needed    Follow-up with pcp AND CARDIOLOGY FOR JC, CALL AND MAKE APPOINTMENT   Follow-up tests/labs, jc AND CARDIOLOGY WORK UP     Follow-up Information     Follow up With Specialties Details Why Contact Info    Keely Cronin NP Nurse Practitioner   [address] [place] [address]  [phone]      Mary Grace Lopez MD Cardio Vascular Surgery, Interventional Cardiology, Cardiology  CALL AND MAKE APPOINTMENT  [address] [place] [address] At [facility] [address] [facility]  [phone]          ________________________________________________________________    Risk of deterioration: Low    Condition at Discharge:  Stable  __________________________________________________________________    Disposition  Home with family and home health services    ____________________________________________________________________    Code Status: Full Code  ___________________________________________________________________      Total time in minutes spent coordinating this discharge (includes going over instructions, follow-up, prescriptions, and preparing report for sign off to her PCP) :  >30 minutes    Signed:  Francesca Shaffer MD

## 2022-02-22 NOTE — PROGRESS NOTES
0230: TRANSFER - IN REPORT:    Verbal report received from [address] [place] [address], [facility] (name) on Cuca Medeiros  being received from ICU(unit) for routine progression of care      Report consisted of patients Situation, Background, Assessment and   Recommendations(SBAR). Information from the following report(s) SBAR, Kardex, Intake/Output, MAR, Recent Results, Cardiac Rhythm Sinus kathe and Alarm Parameters  was reviewed with the receiving nurse. Opportunity for questions and clarification was provided. Assessment completed upon patients arrival to unit and care assumed. Primary Nurse Jaxon Elmore RN and Yola Thomason RN performed a dual skin assessment on this patient No impairment noted  Paulino score is 10    0730: Bedside shift change report given to Ebony Mcguire RN (oncoming nurse) by [facility], RN (offgoing nurse). Report included the following information SBAR, Kardex, Intake/Output, MAR, Recent Results, Cardiac Rhythm sinus kathe and Alarm Parameters .

## 2022-02-22 NOTE — PROGRESS NOTES
[facility]  Hospitalist Group     ICU Transfer/Accept Summary     This patient is being [place] [address] [place]  DATE OF TRANSFER: 2/21/2022       PATIENT ID: Robel Matthew  MRN: [id number]   YOB: 1965    PRIMARY CARE PROVIDER: Keely Cronin NP   DATE OF ADMISSION: 2/20/2022  3:54 PM    ATTENDING PHYSICIAN: EMILY Finch  CONSULTATIONS:   IP CONSULT TO NEUROLOGY  IP CONSULT TO CARDIOLOGY    PROCEDURES/SURGERIES:   * No surgery found *    REASON FOR ADMISSION: <principal problem not specified>     HOSPITAL PROBLEM LIST:  Patient Active Problem List   Diagnosis Code    SOB (shortness of breath) R06.02    Murmur, cardiac R01.1    Left hemiparesis (HCC) G81.94         Brief HPI and Hospital Course:      From Critical care HPI \" 61year old female with a hx of HTN who presents w sudden onset of left upper and lower extremity weakness, left sided sensory changes and difficulty ambulating X 45 minutes. NIH at the time of my exam is 2. Patient has a past medication history of hyperlipidemia and HTN. No other past medical history. Currently at time of exam she is complaining of left sided weakness, mild nausea, light headedness and some left arm and leg numbness. Patient received TPA on 02/20/22 at [phone] \"    Hospitalist note:  Chart reviewed. Patient examined at bedside. No acute distress noted. Patient denies chest pain or abdominal pain, headache, dizziness, shortness of breath, nausea or vomiting, left-sided weakness. Is alert and oriented x4. No weakness appreciated on examination. Patient endorses feeling strange and overwhelmed with current circumstances. Vital signs stable during examination. Patient appropriate for downgrade.     Assessment and Plan:    CVA s/p TPA  -MRI brain: Acute on subacute infarct  -Neurology following  -Continue aspirin  -Continue atorvastatin 80 mg  -SBP BP goal less than 140, per neurology  Palpitation  -Cardiology following  -30 day outpatient Holter monitor per cardiology  -Outpatient RONNIE per cardiology  Hypertension  -SBP goal less than 140, neurology  -As needed labetalol in place  - resume home antihypertensive on 2/22  Dyslipidemia  -Continue aspirin  -Continue atorvastatin 80 mg         PHYSICAL EXAMINATION:  Visit Vitals  /76   Pulse 85   Temp 97.9 °F (36.6 °C)   Resp 28   Ht 5' 3\" (1.6 m)   Wt 67 kg (147 lb 11.3 oz)   SpO2 100%   Breastfeeding No   BMI 26.17 kg/m²       General:          Alert, cooperative, no distress  HEENT:           Atraumatic, MMM            Neck:               Supple, symmetrical,  thyroid: non tender  Lungs:             Clear to auscultation bilaterally. No Wheezing or Rhonchi. No rales. Heart:              Regular  rhythm,  No  murmur   No edema  Abdomen:       Soft, non-tender. Not distended. Bowel sounds normal  Extremities:     No cyanosis. No clubbing,  +2 distal pulses  Skin:                Not pale. Not Jaundiced  No rashes   Psych:             Not anxious or agitated. Neurologic:      Alert, moves all extremities, oriented X 3. Labs:     Recent Labs     02/20/22  1626   WBC 4.6   HGB 11.3*   HCT 36.0        Recent Labs     02/20/22  1626      K 3.6      CO2 28   BUN 18   CREA 0.86   *   CA 9.6     Recent Labs     02/20/22  1626   ALT 39   AP 64   TBILI 0.2   TP 7.6   ALB 3.8   GLOB 3.8     Recent Labs     02/20/22  1626   INR 1.0   PTP 10.5      No results for input(s): FE, TIBC, PSAT, FERR in the last 72 hours. No results found for: FOL, RBCF   No results for input(s): PH, PCO2, PO2 in the last 72 hours. No results for input(s): CPK, CKNDX, TROIQ in the last 72 hours.     No lab exists for component: CPKMB  Lab Results   Component Value Date/Time    Cholesterol, total 241 (H) 02/20/2022 04:26 PM    HDL Cholesterol 49 02/20/2022 04:26 PM    LDL, calculated 164 (H) 02/20/2022 04:26 PM    Triglyceride 140 02/20/2022 04:26 PM    CHOL/HDL Ratio 4.9 02/20/2022 04:26 PM     Lab Results   Component Value Date/Time    Glucose (POC) 119 (H) 02/20/2022 03:52 PM     No results found for: COLOR, APPRN, SPGRU, REFSG, CHRISTIANE, PROTU, GLUCU, KETU, BILU, UROU, ZAHEER, LEUKU, GLUKE, EPSU, BACTU, WBCU, RBCU, CASTS, UCRY      CODE STATUS:   Full Code    DNR    Partial    Comfort Care       Signed:   EMILY Avery  Date of Service:  2/21/2022  7:50 PM

## 2022-02-22 NOTE — PROGRESS NOTES
Transition of Care Plan: Home    RUR: 6% low    PCP F/U: Leslye Lezama NP    Disposition: home    Transportation: family    Main Contact: Child-Domineque GEKEVVM-[phone]    1147: Met with patient at the bedside. A&O x 4. Confirmed demographics. States she lives alone in a three story home. No issues getting up and down the stairs. No DME use. Independent with ADLs at baseline. States uses [employer] [facility] [employer]. PT/OT has signed off. Patient states family member will transport her home. Reason for Admission:  right putamen acute-subacute infarct                    RUR Score:     6% low                Plan for utilizing home health:      n/a    PCP: First and Last name:  Ralph Matute NP     Name of Practice:    Are you a current patient: Yes/No: yes   Approximate date of last visit: 02/07/2022   Can you participate in a virtual visit with your PCP:                     Current Advanced Directive/Advance Care Plan: Full Code      Healthcare Decision Maker:   Click here to complete [employer] including selection of the Healthcare Decision Maker Relationship (ie \"Primary\")             Primary Decision MakerNodiane Velez - Daughter - [phone]    Secondary Decision Maker: Pratima Carrasco - Child - [phone]                  Transition of Care Plan:     home                 Care Management Interventions  PCP Verified by CM: Yes  Mode of Transport at Discharge:  Other (see comment) (family)  Physical Therapy Consult: Yes  Occupational Therapy Consult: Yes  Speech Therapy Consult: Yes  Support Systems: Child(prisca)  Confirm Follow Up Transport: Family  Discharge Location  Patient Expects to be Discharged to[de-identified] Home

## 2022-02-22 NOTE — PROGRESS NOTES
Follow up visit in Room 665. Patient was sitting up on the side of her bed watching TV. Kenzie immediately thanked the  for the conversation on yesterday. She shared that it was liberating for her and helped her to think about better ways of taking care of herself. Discussed Self-care techniques.  shared different anxiety containment exercises to use when she is feeling overwhelmed. Kenzie also shared her concerns about her health, family, and work. Provided empathic listening, grief support/counseling, and continued cultivating a relationship of care and support. Advised of  availability. Visited by: Rufino Martinez.  Chapo Lehman, [address] [place] [address] Road paging Service [phone]

## 2022-02-22 NOTE — PROGRESS NOTES
02/22/22 1755   Vital Signs   Pulse (Heart Rate) 74   Heart Rate Source Monitor   Cardiac Rhythm Sinus Rhythm   Resp Rate 20   O2 Sat (%) 97 %   Level of Consciousness Alert (0)   /79   MAP (Calculated) 88   BP 1 Method Automatic   BP 1 Location Right upper arm   BP Patient Position At rest;Lying;Supine   MEWS Score 1   Alarms Set and Audible Cardiac alarms;Pulse ox alarms; Respiratory alarms   Box Number 665   Electrodes Replaced No   Additional Blood Pressure/Pulse Data   Pulse 2 70   BP 2 109/78   MAP 2 (Calculated) 88   BP 2 Location Right arm   BP Method 2 Automatic   Patient Position 2 Sitting   Pulse 3 83   BP 3 108/79   MAP 3 (Calculated) 89   BP 3 Location Right arm   BP Method 3 Automatic

## 2022-02-22 NOTE — PROGRESS NOTES
Neurocritical Care Progress Note  José Mckenzie NP    Admit Date: 2/20/2022   LOS: 2 days      Daily Progress Note: 2/22/2022    S/P: tPA on 02/20/22    HPI: Amadeo Broderick is a 61 y.o. F with a pmh of smoker 30yrs ago, Hypertension and hyperlipidemia who presented to ED on 02/20/22 with left sided weakness associated with n/v, and numbness as code S alert. BP upon arrival was elevated in the 780'X and 998'Q systolic. CTH obtained showed no acute process. The patient was deemed tPA candidate after evaluation by tele-neurologist with NIH score of 2. CTA head and neck and CTP showed no LVO or any significant stenosis or perfusion mismatch. The patient was admitted to ICU for close monitoring post tPA. Neurology was consulted and stroke work-up was ordered. Subjective:   No acute events noted overnight. Patient reported mild headache which improved with tylenol. Denied any neurological deficits. Neuro exams intact except mild asymmetry smile. MRI Brain showed right putamen acute-subacute infarct. Planned to be transition to nstu. Stroke w/u completed. On secondary stroke prevention; ASA, high-statin, and antihypertensive. Allergies   Allergen Reactions    Adhesive Rash     Allergic to paper tape    Adhesive Tape-Silicones Rash    Sulfa (Sulfonamide Antibiotics) Rash       Past Medical History:   Diagnosis Date    Hyperlipidemia      No family history on file. Social History     Tobacco Use    Smoking status: Former Smoker    Smokeless tobacco: Never Used   Substance Use Topics    Alcohol use: Yes     Alcohol/week: 0.0 standard drinks      Prior to Admission Medications   Prescriptions Last Dose Informant Patient Reported? Taking? ZINC PO 2/20/2022 at Unknown time  Yes Yes   Sig: Take 1 Tablet by mouth daily. ascorbic acid (VITAMIN C PO) 2/20/2022 at Unknown time  Yes Yes   Sig: Take 1 Tablet by mouth daily.    cholecalciferol, vitamin D3, (VITAMIN D3 PO) 2/20/2022 at Unknown time Yes Yes   Sig: Take 1 Tablet by mouth daily. losartan-hydroCHLOROthiazide (HYZAAR) 50-12.5 mg per tablet 2022 at Unknown time  Yes Yes   Sig: Take 1 Tablet by mouth daily. omega-3 fatty acids cap   Yes Yes   Sig: Take 1 Caplet by mouth daily. Facility-Administered Medications: None         Objective:   Vital signs  Temp (24hrs), Av.9 °F (36.6 °C), Min:97.7 °F (36.5 °C), Max:98 °F (36.7 °C)   No intake/output data recorded.  0701 -  1900  In: 1195.1 [P.O.:100; I.V.:1095.1]  Out: -   Visit Vitals  BP 98/75   Pulse (!) 55   Temp 98 °F (36.7 °C)   Resp 12   Ht 5' 3\" (1.6 m)   Wt 67 kg (147 lb 11.3 oz)   SpO2 99%   Breastfeeding No   BMI 26.17 kg/m²      O2 Device: None (Room air)   Vitals:    22 2000 22 2100 22 2200 22 2300   BP: (!) 136/91 118/85 121/87 98/75   Pulse: 61 69 71 (!) 55   Resp:  12   Temp: 98 °F (36.7 °C)      SpO2: 100% 100% 97% 99%   Weight:       Height:            Meds:     Current Facility-Administered Medications   Medication Dose Route Frequency    losartan/hydroCHLOROthiazide (HYZAAR) 50/12.5 mg   Oral DAILY    aspirin chewable tablet 81 mg  81 mg Oral DAILY    acetaminophen (TYLENOL) tablet 650 mg  650 mg Oral Q6H PRN    labetaloL (NORMODYNE;TRANDATE) injection 10 mg  10 mg IntraVENous Q5MIN PRN    ondansetron (ZOFRAN) injection 4 mg  4 mg IntraVENous Q6H PRN    atorvastatin (LIPITOR) tablet 80 mg  80 mg Oral QHS       I personally reviewed all of the medications      Review of Systems:   Admits mild headache otherwise denied any visual changes, speech problems, nose, throat problems; no coughing or wheezing or shortness of breath, No chest pain or orthopnea, no abdominal pain, nausea or vomiting, No pain in the body or extremities, no psychiatric, neurological, endocrine, hematological or cardiac complaints except as noted above. Physical Exam:   Blood pressure 98/75, pulse (!) 55, temperature 98 °F (36.7 °C), resp.  rate 12, height 5' 3\" (1.6 m), weight 67 kg (147 lb 11.3 oz), SpO2 99 %, not currently breastfeeding. GEN: NAD, cooperative, calm  HEENT: Normocephalic. Non-icter, no congestion  Lungs:  CTA bilaterally   Cardiac: S1,S2, normal rate and rhythm, no carotid bruits, no gallops  Abdomen: Normal bowel sounds, no distention, non-tender  Extremities: no clubbing, cyanosis, or edema  Skin: no rashes or lesions noted      NEURO:  Mental status: A & O x 4. Able to follow commands appropriately  Cranial Nerves:  II-XII intact; PERRL, EOMI, No dysarthria or aphasia. Full facial strength with asymmetry smile. Hearing intact bilaterally. Tongue protrudes to midline, palate elevates symmetrically. Shrug Shoulders B/L  Motor:  Normal bulk and tone, 5/5 strength x 4 extremities proximally and distally; No involuntary movements. Coordination:  Intact FTN and HTS testing  Reflexes:  +2 throughout, down going toes bilaterally   Sensation: Intact x 4 extremities to Light Touch, temperature,   Gait:  Deferred     NIHSS  1a  Level of consciousness: 0=alert; keenly responsive   1b. LOC questions:  0=Answers both questions correctly   1c. LOC commands: 0=Performs both tasks correctly   2. Best Gaze: 0=normal   3. Visual: 0=No visual loss   4. Facial Palsy: 1=Minor paralysis (flattened nasolabial fold, asymmetric on smiling)   5a. Motor left arm: 0=No drift, arm holds 90 (or 45) degrees for full 10 seconds   5b. Motor right arm: 0=No drift, arm holds 90 (or 45) degrees for full 10 seconds   6a. Motor left le=No drift; leg holds 30-degree position for full 5 seconds. 6b  Motor right le=No drift; leg holds 30-degree position for full 5 seconds. 7. Limb Ataxia: 0=Absent   8. Sensory: 0=Normal; no sensory loss   9. Best Language:  0=No aphasia, normal   10. Dysarthria: 0=Normal   11.  Extinction and Inattention: 0=No abnormality    Total:    1     Labs/images:     Lab Results   Component Value Date/Time    WBC 4.6 2022 04:26 PM HGB 11.3 (L) 02/20/2022 04:26 PM    HCT 36.0 02/20/2022 04:26 PM    PLATELET 765 99/52/5258 04:26 PM    MCV 90.0 02/20/2022 04:26 PM      Lab Results   Component Value Date/Time    Sodium 138 02/20/2022 04:26 PM    Potassium 3.6 02/20/2022 04:26 PM    Chloride 104 02/20/2022 04:26 PM    CO2 28 02/20/2022 04:26 PM    Anion gap 6 02/20/2022 04:26 PM    Glucose 118 (H) 02/20/2022 04:26 PM    BUN 18 02/20/2022 04:26 PM    Creatinine 0.86 02/20/2022 04:26 PM    BUN/Creatinine ratio 21 (H) 02/20/2022 04:26 PM    GFR est AA >60 02/20/2022 04:26 PM    GFR est non-AA >60 02/20/2022 04:26 PM    Calcium 9.6 02/20/2022 04:26 PM    Bilirubin, total 0.2 02/20/2022 04:26 PM    Alk. phosphatase 64 02/20/2022 04:26 PM    Protein, total 7.6 02/20/2022 04:26 PM    Albumin 3.8 02/20/2022 04:26 PM    Globulin 3.8 02/20/2022 04:26 PM    A-G Ratio 1.0 (L) 02/20/2022 04:26 PM    ALT (SGPT) 39 02/20/2022 04:26 PM    AST (SGOT) 26 02/20/2022 04:26 PM       CT Results (most recent):  Results from Hospital Encounter encounter on 02/20/22    CT CODE NEURO PERF W CBF    Narrative  Clinical indication: Code stroke. CT brain perfusion was performed with generation of hemodynamic maps of multiple  parameters, including cerebral blood flow, cerebral blood volume, and MTT (mean  transit time). CT dose reduction was achieved through use of a standardized  protocol tailored for this examination and automatic exposure control for dose  modulation. No significant acute abnormality. Impression  No acute findings. Assessment:     Active Problems:    Left hemiparesis ([address] (2/20/2022)      Plan:     · MRI brain without contrast showed right putamen acute-subacute infarct with presumed etiology likely due to small vessel disease.   · CTA head and neck showed no significant stenosis or any LVO  · Echocardiogram with EF of 55 to 60% with normal wall motion  · Continue with secondary stroke prevention aspirin 81 mg daily, high intensity statin Lipitor 80 mg daily keep LDL goal less than 70 (patient's LDL is not at goal at 164), and blood pressure control  · Hemoglobin A1c is 6.6; will require diabetic education  · Stroke education  · DVT prophylaxis  · Patient's deficits improved currently denied any weakness; no need for PT/OT    Further neurology recommendation to follow by Dr. Linh Allison reviewed    Case discussed with: intensivist, patient and ICU RN    >30% time spent in counseling or coordination of care of the above in the assessment and plan     Signed By: Obdulio Styles NP                    February 22, 2022    Neurology Staff Addendum:    I have reviewed the documentation provided by the nurse practitioner, discussed her findings, clinical impression, and the proposed management plans with regards to this encounter. I have personally evaluated the patient and verified the history and confirmed the physical findings. Below are my additional findings:    61year old AAF with a h/o HTN, HPL admitted with acute left hemiparesis/hemisensory deficit 2/20/22 s/p tPA. Symptoms are presently resolved with non-focal examination. MRI Brain reveals punctate R basal ganglia infarct. No evidence of LVO or significant stenosis on CTA. Possible small vessel thrombosis vs cardioembolic w/recent palpitations and dizziness. Cardiology is following and appreciate their recommendations for outpatient RONNIE and extended cardiac monitoring for further evaluation of above symptoms. She does report some headache and dizziness this AM. Her current stroke territory would not account for these symptoms. No evidence of vertebrobasilar insufficiency on vessel imaging. Examination remains non-focal today. Will continue ASA 81mg and high dose statin therapy for stroke prevention with goal SBP<140, LDL<70. [id number] [address] for discharge. Please arrange for Neurology F/U 4-6 weeks.      Jayant Huerta,   02/22/22

## 2022-02-22 NOTE — PROGRESS NOTES
SPEECH PATHOLOGY BEDSIDE SWALLOW EVALUATION/DISCHARGE  Patient: Colette Nick (57 y.o. female)  Date: 2/22/2022  Primary Diagnosis: CVA (cerebral vascular accident) [facility]) [I63.9]       Precautions: Aspiration       ASSESSMENT :  Based on the objective data described below, the patient presents with functional swallow for a regular diet and thin liquids with general aspiration and reflux precautions. Pt is a 62 yo female who came to the hospital due to feeling left sided weakness in [place] on 2/20. Pt received tPA and now only with mild left facial asymmetry. PMH significant for former smoker 30 years ago, HTN, hyperlipidemia. MRI 2/21 revealed right putamen acute-subacute infarct. Pt with mild right facial asymmetry most noticeable around her mouth. Pt able to self feed, functional mastication, labial closure, suck from straw, no anterior spillage, no residue, functional swallow initiation, palpable laryngeal elevation and without s/s of aspiration or changes in vocal quality. Pt has been tolerating a regular diet without difficulty. Pt also presenting with baseline cognition and denies any changes in her ability to problem solve, thought processing, functional memory but does endorse some anxiety. Pt was encouraged to speak with the MD.  Pt able to hold a functional and detailed conversation regarding her work history, family, and current hospitalization. Given above, recommend continuing with a regular diet and thin liquids with general aspiration and reflux precautions (sit upright for all po intake, remain upright after po intake for at least 30 minutes). Skilled acute therapy provided by a speech-language pathologist is not indicated at this time.      PLAN :  Recommendations:  Continue with a regular diet and thin liquids with general aspiration and reflux precautions (sit upright for all po intake, remain upright for at least 30 minutes after po)    Discharge Recommendations: None     SUBJECTIVE:   Patient stated hello. OBJECTIVE:     Past Medical History:   Diagnosis Date    Hyperlipidemia    No past surgical history on file. Prior Level of Function/Home Situation:   Home Situation  Home Environment: Private residence  # Steps to Enter: 5  Rails to Enter: No  One/Two Story Residence: Other (Comment) (tri-level w/ basement--washer & dryer there)  # of Interior Steps: 14  Interior Rails: Right  Living Alone: Yes  Support Systems: Child(prisca)  Patient Expects to be Discharged to[de-identified] Home  Current DME Used/Available at Home: Crutches  Tub or Shower Type: Tub/Shower combination  Diet prior to admission: regular / thin  Current Diet:  Regular / thin   Cognitive and Communication Status:  Neurologic State: Alert  Orientation Level: Oriented X4  Cognition: Appropriate decision making,Appropriate for age attention/concentration,Appropriate safety awareness,Decreased attention/concentration,Follows commands  Perception: Appears intact  Perseveration: No perseveration noted  Safety/Judgement: Awareness of environment,Fall prevention,Good awareness of safety precautions,Insight into deficits,Home safety  Oral Assessment:  Oral Assessment  Labial: No impairment  Dentition: Natural  Oral Hygiene: clear, moist  Lingual: No impairment  Mandible: No impairment  P.O. Trials:  Patient Position: sitting upright on the side of the bed  Vocal quality prior to P.O.: No impairment  Consistency Presented: Thin liquid; Solid;Puree  How Presented: Self-fed/presented;Cup/sip;Spoon;Straw;Successive swallows        Bolus Formation/Control: No impairment     Propulsion: No impairment  Oral Residue: None  Initiation of Swallow: No impairment  Laryngeal Elevation: Functional  Aspiration Signs/Symptoms: None  Pharyngeal Phase Characteristics: No impairment, issues, or problems       NOMS:   The NOMS functional outcome measure was used to quantify this patient's level of swallowing impairment.   Based on the NOMS, the patient was determined to be at level 7 for swallow function     NOMS Swallowing Levels:  Level 1 (CN): NPO  Level 2 (CM): NPO but takes consistency in therapy  Level 3 (CL): Takes less than 50% of nutrition p.o. and continues with nonoral feedings; and/or safe with mod cues; and/or max diet restriction  Level 4 (CK): Safe swallow but needs mod cues; and/or mod diet restriction; and/or still requires some nonoral feeding/supplements  Level 5 (CJ): Safe swallow with min diet restriction; and/or needs min cues  Level 6 (CI): Independent with p.o.; rare cues; usually self cues; may need to avoid some foods or needs extra time  Level 7 ([address] [place] [address]): Independent for all p.o.  JESUS. (2003). National Outcomes Measurement System (NOMS): Adult Speech-Language Pathology User's Guide. Pain:  Pain Scale 1: Numeric (0 - 10)  Pain Intensity 1: 0     After treatment:   Patient left in no apparent distress in bed, Call bell within reach and Nursing notified    COMMUNICATION/EDUCATION:   Patient was educated regarding her functional swallow as this relates to her diagnosis of stroke. She demonstrated Good understanding as evidenced by verbal understanding. The patient's plan of care including recommendations, planned interventions, and recommended diet changes were discussed with: Registered nurse.      Thank you for this referral.  ELOY Arnold  Time Calculation: 12 mins

## 2022-02-22 NOTE — PROGRESS NOTES
Hospital follow-up PCP transitional care appointment has been scheduled with Ronald Virk NP for Monday, 2/28/22 at 10:00 a.m. Pending patient discharge.   Ally Vega, Care Management Specialist.

## 2022-02-22 NOTE — PROGRESS NOTES
Hospitalist Progress Note    NAME: Rebeka Louis   :  1965   MRN:  [id number]       Assessment / Plan:  Patient 62 yo female admitted for right punctate basal ganglia infarct  1. Right basal ganglia infarct: Status post TPA, MRI shows no bleeding post TPA. Continue aspirin. Continue Lipitor  2. Hypertension: Will resume home medication  3. Dizziness: We will check orthostatic vital sign. Meclizine as needed continue monitoring advised patient if she feeling better later today can be discharged      25.0 - 29.9 Overweight / Body mass index is 26.17 kg/m². Estimated discharge date:   Barriers:    Code status: Full  Prophylaxis: Hep SQ  Recommended Disposition: Home w/Family   Patient was discharged, canceled discharge due to dizziness, lightheadedness nausea. Subjective:     Chief Complaint / Reason for Physician Visit  \" Patient reports dizzy, but worse than earlier. Nauseous no vomiting\". Discussed with RN events overnight. Review of Systems:  Symptom Y/N Comments  Symptom Y/N Comments   Fever/Chills    Chest Pain     Poor Appetite    Edema     Cough    Abdominal Pain     Sputum    Joint Pain     SOB/COREAS    Pruritis/Rash     Nausea/vomit    Tolerating PT/OT     Diarrhea    Tolerating Diet     Constipation    Other       Could NOT obtain due to:      Objective:     VITALS:   Last 24hrs VS reviewed since prior progress note.  Most recent are:  Patient Vitals for the past 24 hrs:   Temp Pulse Resp BP SpO2   22 1401 -- 82 -- -- --   22 1400 97.9 °F (36.6 °C) 78 14 (!) 146/94 98 %   22 1200 -- 78 -- -- --   22 1040 -- 75 -- (!) 135/92 --   22 1039 -- -- -- (!) 148/110 --   22 1000 -- 80 -- -- --   22 0946 98.2 °F (36.8 °C) 79 16 (!) 142/95 100 %   22 0834 -- 71 -- 122/84 --   22 0600 -- (!) 52 13 115/76 98 %   22 0552 -- (!) 53 -- -- --   22 0352 -- (!) 47 -- -- --   22 0300 -- (!) 56 15 (!) 125/91 99 %   02/22/22 0245 -- 62 20 127/88 100 %   02/22/22 0238 97.6 °F (36.4 °C) (!) 52 16 (!) 130/95 100 %   02/22/22 0218 -- (!) 56 14 -- 100 %   02/22/22 0100 -- (!) 58 14 120/85 100 %   02/22/22 0000 97.6 °F (36.4 °C) (!) 54 13 101/72 99 %   02/21/22 2300 -- (!) 55 12 98/75 99 %   02/21/22 2200 -- 71 25 121/87 97 %   02/21/22 2100 -- 69 17 118/85 100 %   02/21/22 2000 98 °F (36.7 °C) 61 21 (!) 136/91 100 %   02/21/22 1900 -- 85 28 130/76 --   02/21/22 1814 -- 80 17 -- 100 %   02/21/22 1800 -- 75 16 (!) 128/95 100 %   02/21/22 1717 -- 72 14 (!) 153/97 100 %   02/21/22 1600 97.9 °F (36.6 °C) 65 16 128/83 98 %   02/21/22 1530 -- 68 17 -- --   02/21/22 1500 -- 77 25 (!) 142/87 --       Intake/Output Summary (Last 24 hours) at 2/22/2022 1447  Last data filed at 2/21/2022 1600  Gross per 24 hour   Intake 246.67 ml   Output --   Net 246.67 ml        I had a face to face encounter and independently examined this patient on 2/22/2022, as outlined below:  PHYSICAL EXAM:  General: WD, WN. Alert, cooperative, no acute distress    EENT:  EOMI. Anicteric sclerae. MMM  Resp:  CTA bilaterally, no wheezing or rales. No accessory muscle use  CV:  Regular  rhythm,  No edema  GI:  Soft, Non distended, Non tender. +Bowel sounds  Neurologic:  Alert and oriented X 3, normal speech,   Psych:   Good insight. Not anxious nor agitated  Skin:  No rashes. No jaundice    Reviewed most current lab test results and cultures  YES  Reviewed most current radiology test results   YES  Review and summation of old records today    NO  Reviewed patient's current orders and MAR    YES  PMH/SH reviewed - no change compared to H&P  ________________________________________________________________________  Care Plan discussed with:    Comments   Patient     Family      RN     Care Manager     Consultant                        Multidiciplinary team rounds were held today with , nursing, pharmacist and clinical coordinator.   Patient's plan of care was discussed; medications were reviewed and discharge planning was addressed. ________________________________________________________________________  Total NON critical care TIME:  35 Minutes    Total CRITICAL CARE TIME Spent:   Minutes non procedure based      Comments   >50% of visit spent in counseling and coordination of care     ________________________________________________________________________  Evette Garcia MD     Procedures: see electronic medical records for all procedures/Xrays and details which were not copied into this note but were reviewed prior to creation of Plan. LABS:  I reviewed today's most current labs and imaging studies.   Pertinent labs include:  Recent Labs     02/22/22  0243 02/20/22  1626   WBC 4.3 4.6   HGB 10.6* 11.3*   HCT 33.5* 36.0    243     Recent Labs     02/22/22  0243 02/20/22  1626    138   K 3.8 3.6    104   CO2 28 28   * 118*   BUN 14 18   CREA 0.76 0.86   CA 9.5 9.6   MG 2.1  --    PHOS 4.0  --    ALB  --  3.8   TBILI  --  0.2   ALT  --  39   INR  --  1.0       Signed: Evette Garcia MD

## 2022-02-22 NOTE — PROGRESS NOTES
1930: Bedside and Verbal shift change report given to [address] (oncoming nurse) by Harpal Hernández RN (offgoing nurse). Report included the following information SBAR, Kardex, ED Summary, Intake/Output, MAR, Recent Results, Cardiac Rhythm SR/SB, Alarm Parameters  and Dual Neuro Assessment. TRANSFER - OUT REPORT:    Verbal report given to Kimberly Morfin RN(name) on [facility]  being transferred to NSTU(unit) for routine progression of care       Report consisted of patients Situation, Background, Assessment and   Recommendations(SBAR). Information from the following report(s) SBAR, Kardex, ED Summary, Intake/Output, MAR, Recent Results, Cardiac Rhythm SR/SB, Alarm Parameters  and Dual Neuro Assessment was reviewed with the receiving nurse. Lines:   Peripheral IV 02/20/22 Left;Upper Forearm (Active)   Site Assessment Clean, dry, & intact 02/21/22 2000   Phlebitis Assessment 0 02/21/22 2000   Infiltration Assessment 0 02/21/22 2000   Dressing Status Clean, dry, & intact 02/21/22 2000   Dressing Type Transparent 02/21/22 2000   Hub Color/Line Status Pink;Capped 02/21/22 2000   Action Taken Open ports on tubing capped 02/21/22 2000   Alcohol Cap Used Yes 02/21/22 2000       Peripheral IV 02/20/22 Right Antecubital (Active)   Site Assessment Clean, dry, & intact 02/21/22 2000   Phlebitis Assessment 0 02/21/22 2000   Infiltration Assessment 0 02/21/22 2000   Dressing Status Clean, dry, & intact 02/21/22 2000   Dressing Type Transparent 02/21/22 2000   Hub Color/Line Status Pink;Capped 02/21/22 2000   Action Taken Open ports on tubing capped 02/21/22 2000   Alcohol Cap Used Yes 02/21/22 2000        Opportunity for questions and clarification was provided.       Patient transported with:   Monitor  Registered Nurse

## 2022-02-22 NOTE — PROGRESS NOTES
Occupational Therapy   02.22.2022    Orders acknowledged and chart reviewed in prep for OT evaluation. Noted patient evaluated and discharged from OT yesterday (2/21). Discussed patient case with RN who reports patient has new onset nausea and dizziness but no other symptoms and no OT needs. Will sign off at this time. Thank you. Recommend with nursing, ADLs with supervision/setup, OOB to chair 3x/day and toileting via functional mobility to and from bathroom. Thank you for completing as able in order to maintain patient strength, endurance and independence. Eh Gurrola MS, OTR/L

## 2022-02-22 NOTE — PROGRESS NOTES
Bedside and Verbal shift change report given to ARYA Lomas (oncoming nurse) by Karilyn Sandhoff (offgoing nurse). Report included the following information SBAR, Kardex, Intake/Output, MAR, Recent Results, Cardiac Rhythm NSR and Dual Neuro Assessment.

## 2022-02-23 VITALS
HEART RATE: 91 BPM | HEIGHT: 63 IN | RESPIRATION RATE: 15 BRPM | TEMPERATURE: 98.1 F | DIASTOLIC BLOOD PRESSURE: 69 MMHG | OXYGEN SATURATION: 100 % | WEIGHT: 147.71 LBS | BODY MASS INDEX: 26.17 KG/M2 | SYSTOLIC BLOOD PRESSURE: 112 MMHG

## 2022-02-23 LAB
ANION GAP SERPL CALC-SCNC: 2 MMOL/L (ref 5–15)
BUN SERPL-MCNC: 18 MG/DL (ref 6–20)
BUN/CREAT SERPL: 20 (ref 12–20)
CALCIUM SERPL-MCNC: 9.8 MG/DL (ref 8.5–10.1)
CHLORIDE SERPL-SCNC: 104 MMOL/L (ref 97–108)
CO2 SERPL-SCNC: 31 MMOL/L (ref 21–32)
CREAT SERPL-MCNC: 0.88 MG/DL (ref 0.55–1.02)
ERYTHROCYTE [DISTWIDTH] IN BLOOD BY AUTOMATED COUNT: 13.3 % (ref 11.5–14.5)
GLUCOSE SERPL-MCNC: 114 MG/DL (ref 65–100)
HCT VFR BLD AUTO: 35.2 % (ref 35–47)
HGB BLD-MCNC: 11.2 G/DL (ref 11.5–16)
MAGNESIUM SERPL-MCNC: 2 MG/DL (ref 1.6–2.4)
MCH RBC QN AUTO: 28.6 PG (ref 26–34)
MCHC RBC AUTO-ENTMCNC: 31.8 G/DL (ref 30–36.5)
MCV RBC AUTO: 89.8 FL (ref 80–99)
NRBC # BLD: 0 K/UL (ref 0–0.01)
NRBC BLD-RTO: 0 PER 100 WBC
PHOSPHATE SERPL-MCNC: 4.7 MG/DL (ref 2.6–4.7)
PLATELET # BLD AUTO: 237 K/UL (ref 150–400)
PMV BLD AUTO: 11.2 FL (ref 8.9–12.9)
POTASSIUM SERPL-SCNC: 4.2 MMOL/L (ref 3.5–5.1)
RBC # BLD AUTO: 3.92 M/UL (ref 3.8–5.2)
SODIUM SERPL-SCNC: 137 MMOL/L (ref 136–145)
WBC # BLD AUTO: 4.2 K/UL (ref 3.6–11)

## 2022-02-23 PROCEDURE — 74011250637 HC RX REV CODE- 250/637: Performed by: NURSE PRACTITIONER

## 2022-02-23 PROCEDURE — 84100 ASSAY OF PHOSPHORUS: CPT

## 2022-02-23 PROCEDURE — 36415 COLL VENOUS BLD VENIPUNCTURE: CPT

## 2022-02-23 PROCEDURE — 83735 ASSAY OF MAGNESIUM: CPT

## 2022-02-23 PROCEDURE — 85027 COMPLETE CBC AUTOMATED: CPT

## 2022-02-23 PROCEDURE — 80048 BASIC METABOLIC PNL TOTAL CA: CPT

## 2022-02-23 RX ADMIN — LOSARTAN POTASSIUM: 50 TABLET, FILM COATED ORAL at 08:43

## 2022-02-23 RX ADMIN — ASPIRIN 81 MG 81 MG: 81 TABLET ORAL at 08:43

## 2022-02-23 NOTE — PROGRESS NOTES
Transition of Care Plan: Home     RUR: 5% low     PCP F/U: Anu Nayak NP     Disposition: home     Transportation: family     Main Contact: Child-Yan LANDCV-[phone]     1107: Patient likely to be discharged today. Will continue to follow for any discharge needs.      Rox Witt RN, CRM

## 2022-02-23 NOTE — DISCHARGE SUMMARY
Hospitalist Discharge Summary     Patient ID:  Noa Puckett  [id number]  79 y.o.  1965    PCP on record: Lex Parker NP    Admit date: 2/20/2022  Discharge date and time: 2/23/2022    Please note that this dictation was completed with Topmission, the [employer] voice recognition software. Quite often unanticipated grammatical, syntax, homophones, and other interpretive errors are inadvertently transcribed by the computer software. Please disregard these errors. Please excuse any errors that have escaped final proofreading. Admission Diagnoses: CVA (cerebral vascular accident) [facility]) [I63.9]    Discharge Diagnoses: Active Problems:    Left hemiparesis ([address] (2/20/2022)           Hospital Course:     #Acute right posterior basal ganglia lacunar infarct  #Acute left hemiparesis POA resolved  -MRI brain with acute right posterior below ganglia infarct  -Patient received TPA  -TTE without visible  intracardiac shunt  -CTA head and neck without any stenosis  -No PT OT needs identified  -A1c 6.6 and   -Patient was asked by neurology  -Patient be discharged on aspirin and statin  -We will follow-up with neurology as an outpatient further management of CVA    #Dizziness POA resolved  -Patient follow with primary care for the management  -Patient was discharged on outpatient Holter monitor per cardiology and will follow up with cardiology for RONNIE        CONSULTATIONS:  IP CONSULT TO NEUROLOGY  IP CONSULT TO CARDIOLOGY    Excerpted HPI from H&P of Ruy Esparza MD:    Patient is a 61year old female with a hx of HTN who presents w sudden onset of left upper and lower extremity weakness, left sided sensory changes and difficulty ambulating X 45 minutes. NIH at the time of my exam is 2. Patient has a past medication history of hyperlipidemia and HTN. No other past medical history.  Currently at time of exam she is complaining of left sided weakness, mild nausea, light headedness and some left arm and leg numbness.      1a-LOC:0    1b-Month/Age:0    1c-Open/Close Hand:0    2-Best Gaze:0    3-Visual Fields:0    4-Facial Palsy:0    5a-Left Arm:0    5b-Right Arm:0    6a-Left Le    6b-Right Le    7-Limb Ataxia:0    8-Sensory:1 (left arm and left leg numbness)    9-Best Language:0    10-Dysarthria:0    11-Extinction/Inattention:0  TOTAL SCORE:2  ______________________________________________________________________  DISCHARGE SUMMARY/HOSPITAL COURSE:  for full details see H&P, daily progress notes, labs, consult notes. _______________________________________________________________________  Patient seen and examined by me on discharge day. Pertinent Findings:  Gen:    Not in distress  Chest: Clear lungs  CVS:   Regular rhythm. No edema  Abd:  Soft, not distended, not tender  Neuro:  Alert with good insight. Oriented to person, place, and time   _______________________________________________________________________  DISCHARGE MEDICATIONS:   Current Discharge Medication List      START taking these medications    Details   aspirin 81 mg chewable tablet Take 1 Tablet by mouth daily. Qty: 90 Tablet, Refills: 1  Start date: 2022      atorvastatin (LIPITOR) 80 mg tablet Take 1 Tablet by mouth nightly. Qty: 90 Tablet, Refills: 1  Start date: 2022         CONTINUE these medications which have NOT CHANGED    Details   ascorbic acid (VITAMIN C PO) Take 1 Tablet by mouth daily. ZINC PO Take 1 Tablet by mouth daily. cholecalciferol, vitamin D3, (VITAMIN D3 PO) Take 1 Tablet by mouth daily. losartan-hydroCHLOROthiazide (HYZAAR) 50-12.5 mg per tablet Take 1 Tablet by mouth daily. omega-3 fatty acids cap Take 1 Caplet by mouth daily.              My Recommended Diet, Activity, Wound Care, and follow-up labs are listed in the patient's Discharge Insturctions which I have personally completed and reviewed.     _______________________________________________________________________  DISPOSITION:     Home with Family: x   Home with HH/PT/OT/RN:    SNF/LTC:    TROY:    OTHER:        Condition at Discharge:  Stable  _______________________________________________________________________  Follow up with:   PCP : Fei Wallis NP  Follow-up Information     Follow up With Specialties Details Why Contact Info    Fei Wallis NP Nurse Practitioner On 2/28/2022 Hospital follow up PCP appointment Monday, 2/28/22 at 10:00 a.m.  [facility]      Melody Navarro MD Cardio Vascular Surgery, Interventional Cardiology, Cardiology  [address] [address]  [phone]                Total time in minutes spent coordinating this discharge (includes going over instructions, follow-up, prescriptions, and preparing report for sign off to her PCP) : 35minutes    Signed:  Mine Chambers MD

## 2022-02-23 NOTE — PROGRESS NOTES
I have reviewed discharge instructions with the patient. The patient verbalized understanding. Bedside RN performed patient education and medication education. Discharge concerns initiated and discussed with patient, including clarification on \"who\" assists the patient at their home and instructions for when the home going patient should call their provider after discharge. Opportunity for questions and clarification was provided. Patient receptive to education: YES  Patient stated: Verbalized Understanding  Barriers to Education: None  Diagnosis Education given:  YES    Length of stay: 3  Expected Day of Discharge: 2/23/2022  Ask if they have \"Help at Home\" & add to white board?   YES    Education Day #: 3    Medication Education Given:  YES  M in the box Medication name: Aspirin & Lipitor    Pt aware of HCAHPS survey: YES          Stroke Education documented in Patient Education: YES  Core Measures Documented in Connect [facility]:  Risk Factors: YES  Warning signs of stroke: YES  When to Activate 911: YES  Medication Education for Risk Factors: YES  Smoking cessation if applicable: YES  Written Education Given:  YES    Discharge NIH Completed: YES  Score: 0

## 2022-02-23 NOTE — PROGRESS NOTES
Problem: Diabetes Self-Management  Goal: *Disease process and treatment process  Description: Define diabetes and identify own type of diabetes; list 3 options for treating diabetes. Outcome: Resolved/Met  Goal: *Incorporating nutritional management into lifestyle  Description: Describe effect of type, amount and timing of food on blood glucose; list 3 methods for planning meals. Outcome: Resolved/Met  Goal: *Incorporating physical activity into lifestyle  Description: State effect of exercise on blood glucose levels. Outcome: Resolved/Met  Goal: *Developing strategies to promote health/change behavior  Description: Define the ABC's of diabetes; identify appropriate screenings, schedule and personal plan for screenings. Outcome: Resolved/Met  Goal: *Using medications safely  Description: State effect of diabetes medications on diabetes; name diabetes medication taking, action and side effects. Outcome: Resolved/Met  Goal: *Monitoring blood glucose, interpreting and using results  Description: Identify recommended blood glucose targets  and personal targets. Outcome: Resolved/Met  Goal: *Prevention, detection, treatment of acute complications  Description: List symptoms of hyper- and hypoglycemia; describe how to treat low blood sugar and actions for lowering  high blood glucose level. Outcome: Resolved/Met  Goal: *Prevention, detection and treatment of chronic complications  Description: Define the natural course of diabetes and describe the relationship of blood glucose levels to long term complications of diabetes.   Outcome: Resolved/Met  Goal: *Developing strategies to address psychosocial issues  Description: Describe feelings about living with diabetes; identify support needed and support network  Outcome: Resolved/Met  Goal: *Insulin pump training  Outcome: Resolved/Met  Goal: *Sick day guidelines  Outcome: Resolved/Met  Goal: *Patient Specific Goal (EDIT GOAL, INSERT TEXT)  Outcome: Resolved/Met Problem: Patient Education: Go to Patient Education Activity  Goal: Patient/Family Education  Outcome: Resolved/Met     Problem: Patient Education: Go to Patient Education Activity  Goal: Patient/Family Education  Outcome: Resolved/Met     Problem: TIA/CVA Stroke: 0-24 hours  Goal: Off Pathway (Use only if patient is Off Pathway)  Outcome: Resolved/Met  Goal: Diagnostic Test/Procedures  Outcome: Resolved/Met  Goal: Discharge Planning  Outcome: Resolved/Met  Goal: Medications  Outcome: Resolved/Met  Goal: Treatments/Interventions/Procedures  Outcome: Resolved/Met  Goal: Minimize risk of bleeding post-thrombolytic infusion  Outcome: Resolved/Met  Goal: Monitor for complications post-thrombolytic infusion  Outcome: Resolved/Met  Goal: Psychosocial  Outcome: Resolved/Met  Goal: *Hemodynamically stable  Outcome: Resolved/Met  Goal: *Neurologically stable  Description: Absence of additional neurological deficits    Outcome: Resolved/Met  Goal: *Verbalizes anxiety and depression are reduced or absent  Outcome: Resolved/Met  Goal: *Absence of deep venous thrombosis signs and symptoms(Stroke Metric)  Outcome: Resolved/Met  Goal: *Ability to perform ADLs and demonstrates progressive mobility and function  Outcome: Resolved/Met  Goal: *Stroke education started(Stroke Metric)  Outcome: Resolved/Met  Goal: *Rehab consulted(Stroke Metric)  Outcome: Resolved/Met     Problem: TIA/CVA Stroke: Day 2 Until Discharge  Goal: Off Pathway (Use only if patient is Off Pathway)  Outcome: Resolved/Met  Goal: Activity/Safety  Outcome: Resolved/Met  Goal: Diagnostic Test/Procedures  Outcome: Resolved/Met  Goal: Nutrition/Diet  Outcome: Resolved/Met  Goal: Discharge Planning  Outcome: Resolved/Met  Goal: Medications  Outcome: Resolved/Met  Goal: Respiratory  Outcome: Resolved/Met  Goal: Treatments/Interventions/Procedures  Outcome: Resolved/Met  Goal: Psychosocial  Outcome: Resolved/Met  Goal: *Verbalizes anxiety and depression are reduced or absent  Outcome: Resolved/Met  Goal: *Absence of aspiration  Outcome: Resolved/Met  Goal: *Absence of deep venous thrombosis signs and symptoms(Stroke Metric)  Outcome: Resolved/Met  Goal: *Optimal pain control at patient's stated goal  Outcome: Resolved/Met  Goal: *Tolerating diet  Outcome: Resolved/Met  Goal: *Ability to perform ADLs and demonstrates progressive mobility and function  Outcome: Resolved/Met  Goal: *Stroke education continued(Stroke Metric)  Outcome: Resolved/Met     Problem: Ischemic Stroke: Discharge Outcomes  Goal: *Verbalizes anxiety and depression are reduced or absent  Outcome: Resolved/Met  Goal: *Verbalize understanding of risk factor modification(Stroke Metric)  Outcome: Resolved/Met  Goal: *Hemodynamically stable  Outcome: Resolved/Met  Goal: *Absence of aspiration pneumonia  Outcome: Resolved/Met  Goal: *Aware of needed dietary changes  Outcome: Resolved/Met  Goal: *Verbalize understanding of prescribed medications including anti-coagulants, anti-lipid, and/or anti-platelets(Stroke Metric)  Outcome: Resolved/Met  Goal: *Tolerating diet  Outcome: Resolved/Met  Goal: *Aware of follow-up diagnostics related to anticoagulants  Outcome: Resolved/Met  Goal: *Ability to perform ADLs and demonstrates progressive mobility and function  Outcome: Resolved/Met  Goal: *Absence of DVT(Stroke Metric)  Outcome: Resolved/Met  Goal: *Absence of aspiration  Outcome: Resolved/Met  Goal: *Optimal pain control at patient's stated goal  Outcome: Resolved/Met  Goal: *Home safety concerns addressed  Outcome: Resolved/Met  Goal: *Describes available resources and support systems  Outcome: Resolved/Met  Goal: *Verbalizes understanding of activation of EMS(911) for stroke symptoms(Stroke Metric)  Outcome: Resolved/Met  Goal: *Understands and describes signs and symptoms to report to providers(Stroke Metric)  Outcome: Resolved/Met  Goal: *Neurolgocially stable (absence of additional neurological deficits)  Outcome: Resolved/Met  Goal: *Verbalizes importance of follow-up with primary care physician(Stroke Metric)  Outcome: Resolved/Met  Goal: *Smoking cessation discussed,if applicable(Stroke Metric)  Outcome: Resolved/Met  Goal: *Depression screening completed(Stroke Metric)  Outcome: Resolved/Met     Problem: Falls - Risk of  Goal: *Absence of Falls  Description: Document Sylvester Fall Risk and appropriate interventions in the flowsheet.   Outcome: Resolved/Met     Problem: Patient Education: Go to Patient Education Activity  Goal: Patient/Family Education  Outcome: Resolved/Met

## 2022-02-23 NOTE — PROGRESS NOTES
Follow up visit in Room 665. Kenzie was sitting up on the side of her bed. She shared how she was feeling much better than yesterday. She talked about how she had been thinking about our previous conversation and processing how she could practice better self-care. Her first step is admitting when she just needs to be still. Reviewed anxiety containment exercises, encouraged continued focusing on self and practice of self care, explored hope, and continued cultivating a relationship of support, encouragement, and care. Visited by: Richa Rodriguez.  [address] paging Service [phone] ([phone])

## 2022-03-07 ENCOUNTER — TRANSCRIBE ORDER (OUTPATIENT)
Dept: REGISTRATION | Age: 57
End: 2022-03-07

## 2022-03-07 ENCOUNTER — HOSPITAL ENCOUNTER (OUTPATIENT)
Dept: PREADMISSION TESTING | Age: 57
Discharge: HOME | End: 2022-03-07
Attending: INTERNAL MEDICINE
Payer: COMMERCIAL

## 2022-03-07 DIAGNOSIS — U07.1 COVID-19: ICD-10-CM

## 2022-03-07 DIAGNOSIS — U07.1 COVID-19: Primary | ICD-10-CM

## 2022-03-07 LAB
SARS-COV-2, XPLCVT: NOT DETECTED
SOURCE, COVRS: NORMAL

## 2022-03-07 PROCEDURE — U0005 INFEC AGEN DETEC AMPLI PROBE: HCPCS

## 2022-03-10 ENCOUNTER — ANESTHESIA (OUTPATIENT)
Dept: NON INVASIVE DIAGNOSTICS | Age: 57
End: 2022-03-10
Payer: COMMERCIAL

## 2022-03-10 ENCOUNTER — ANESTHESIA EVENT (OUTPATIENT)
Dept: NON INVASIVE DIAGNOSTICS | Age: 57
End: 2022-03-10
Payer: COMMERCIAL

## 2022-03-10 ENCOUNTER — HOSPITAL ENCOUNTER (OUTPATIENT)
Dept: NON INVASIVE DIAGNOSTICS | Age: 57
Discharge: HOME | End: 2022-03-10
Attending: INTERNAL MEDICINE
Payer: COMMERCIAL

## 2022-03-10 VITALS
DIASTOLIC BLOOD PRESSURE: 85 MMHG | OXYGEN SATURATION: 100 % | WEIGHT: 143 LBS | HEART RATE: 63 BPM | HEIGHT: 63 IN | SYSTOLIC BLOOD PRESSURE: 127 MMHG | BODY MASS INDEX: 25.34 KG/M2 | RESPIRATION RATE: 15 BRPM

## 2022-03-10 DIAGNOSIS — I63.9 IMPENDING CEREBROVASCULAR ACCIDENT (HCC): ICD-10-CM

## 2022-03-10 LAB
ECHO TV REGURGITANT MAX VELOCITY: 1.69 M/S
ECHO TV REGURGITANT PEAK GRADIENT: 11 MMHG

## 2022-03-10 PROCEDURE — 74011000250 HC RX REV CODE- 250: Performed by: NURSE ANESTHETIST, CERTIFIED REGISTERED

## 2022-03-10 PROCEDURE — 74011250636 HC RX REV CODE- 250/636: Performed by: NURSE ANESTHETIST, CERTIFIED REGISTERED

## 2022-03-10 PROCEDURE — 93325 DOPPLER ECHO COLOR FLOW MAPG: CPT

## 2022-03-10 PROCEDURE — 76060000032 HC ANESTHESIA 0.5 TO 1 HR

## 2022-03-10 RX ORDER — SODIUM CHLORIDE, SODIUM LACTATE, POTASSIUM CHLORIDE, CALCIUM CHLORIDE 600; 310; 30; 20 MG/100ML; MG/100ML; MG/100ML; MG/100ML
INJECTION, SOLUTION INTRAVENOUS
Status: DISCONTINUED | OUTPATIENT
Start: 2022-03-10 | End: 2022-03-10 | Stop reason: HOSPADM

## 2022-03-10 RX ORDER — PROPOFOL 10 MG/ML
INJECTION, EMULSION INTRAVENOUS AS NEEDED
Status: DISCONTINUED | OUTPATIENT
Start: 2022-03-10 | End: 2022-03-10 | Stop reason: HOSPADM

## 2022-03-10 RX ORDER — LIDOCAINE HYDROCHLORIDE 20 MG/ML
INJECTION, SOLUTION EPIDURAL; INFILTRATION; INTRACAUDAL; PERINEURAL AS NEEDED
Status: DISCONTINUED | OUTPATIENT
Start: 2022-03-10 | End: 2022-03-10 | Stop reason: HOSPADM

## 2022-03-10 RX ADMIN — PROPOFOL 50 MG: 10 INJECTION, EMULSION INTRAVENOUS at 09:42

## 2022-03-10 RX ADMIN — PROPOFOL 50 MG: 10 INJECTION, EMULSION INTRAVENOUS at 09:39

## 2022-03-10 RX ADMIN — LIDOCAINE HYDROCHLORIDE 40 MG: 20 INJECTION, SOLUTION EPIDURAL; INFILTRATION; INTRACAUDAL; PERINEURAL at 09:38

## 2022-03-10 RX ADMIN — SODIUM CHLORIDE, POTASSIUM CHLORIDE, SODIUM LACTATE AND CALCIUM CHLORIDE: 600; 310; 30; 20 INJECTION, SOLUTION INTRAVENOUS at 09:26

## 2022-03-10 RX ADMIN — PROPOFOL 50 MCG/KG/MIN: 10 INJECTION, EMULSION INTRAVENOUS at 09:38

## 2022-03-10 NOTE — ANESTHESIA PREPROCEDURE EVALUATION
Relevant Problems   RESPIRATORY SYSTEM   (+) SOB (shortness of breath)      CARDIOVASCULAR   (+) Murmur, cardiac       Anesthetic History   No history of anesthetic complications            Review of Systems / Medical History  Patient summary reviewed, nursing notes reviewed and pertinent labs reviewed    Pulmonary  Within defined limits                 Neuro/Psych       CVA       Cardiovascular  Within defined limits                     GI/Hepatic/Renal  Within defined limits              Endo/Other  Within defined limits           Other Findings            Physical Exam    Airway  Mallampati: II  TM Distance: > 6 cm  Neck ROM: normal range of motion   Mouth opening: Normal     Cardiovascular  Regular rate and rhythm,  S1 and S2 normal,  no murmur, click, rub, or gallop             Dental  No notable dental hx       Pulmonary  Breath sounds clear to auscultation               Abdominal  GI exam deferred       Other Findings            Anesthetic Plan    ASA: 2  Anesthesia type: MAC            Anesthetic plan and risks discussed with: Patient

## 2022-03-10 NOTE — PROGRESS NOTES
Anesthesia name:  [facility]    Anesthesia is present for case. Refer to anesthesia log for vitals.

## 2022-03-10 NOTE — ANESTHESIA POSTPROCEDURE EVALUATION
* No procedures listed *. MAC    Anesthesia Post Evaluation        Patient participation: complete - patient participated  Level of consciousness: awake  Pain management: adequate  Airway patency: patent  Anesthetic complications: no  Cardiovascular status: hemodynamically stable  Respiratory status: acceptable  Hydration status: acceptable  Comments: The patient is ready for PACU discharge. Essie Munoz DO                   Post anesthesia nausea and vomiting:  controlled      INITIAL Post-op Vital signs:   Vitals Value Taken Time   /71 03/10/22 1011   Temp     Pulse 72 03/10/22 1012   Resp 21 03/10/22 1012   SpO2 98 % 03/10/22 1012   Vitals shown include unvalidated device data.

## 2022-03-10 NOTE — PROGRESS NOTES
Anesthesia name:  Dr. Trinity Siddiqui     Anesthesia is present for case. Refer to anesthesia log for vitals.

## 2022-03-18 PROBLEM — G81.94 LEFT HEMIPARESIS (HCC): Status: ACTIVE | Noted: 2022-02-20

## 2022-05-20 NOTE — PROGRESS NOTES
Admission Medication Reconciliation:    Information obtained from:  Patient medication list  RxQuery data available¹: Yes    Comments/Recommendations: Updated PTA meds/reviewed patient's allergies. 1) Patient provided a home medication list that she prepared. 2)  Medication changes (since last review): Added  -Losartan/HCTZ   -Supplement/vitamins: Vitamin C, D, Zinc and Omega 3; unable confirm doses. Adjusted  - none    Removed  -ferrous sulfate, multivitamin     3)  Updated Patient's preferred pharmacy ([facility])       [facility] benefit data reflects medications filled and processed through the [facility]), however   this data does NOT capture whether the medication was picked up or is currently being taken by the patient. Allergies:  Adhesive, Adhesive tape-silicones, and Sulfa (sulfonamide antibiotics)    Significant PMH/Disease States:   Past Medical History:   Diagnosis Date    Hyperlipidemia      Chief Complaint for this Admission:    Chief Complaint   Patient presents with    Extremity Weakness     Prior to Admission Medications:   Prior to Admission Medications   Prescriptions Last Dose Informant Patient Reported? Taking? ZINC PO 2/20/2022 at Unknown time  Yes Yes   Sig: Take 1 Tablet by mouth daily. ascorbic acid (VITAMIN C PO) 2/20/2022 at Unknown time  Yes Yes   Sig: Take 1 Tablet by mouth daily. cholecalciferol, vitamin D3, (VITAMIN D3 PO) 2/20/2022 at Unknown time  Yes Yes   Sig: Take 1 Tablet by mouth daily. losartan-hydroCHLOROthiazide (HYZAAR) 50-12.5 mg per tablet 2/20/2022 at Unknown time  Yes Yes   Sig: Take 1 Tablet by mouth daily. omega-3 fatty acids cap   Yes Yes   Sig: Take 1 Caplet by mouth daily. Facility-Administered Medications: None       Please contact the main inpatient pharmacy with any questions or concerns at [phone] and we will direct you to the clinical pharmacist covering this patient's care while in-house.    Kerwin Hernandez [facility]

## 2022-06-22 ENCOUNTER — OFFICE VISIT (OUTPATIENT)
Dept: NEUROLOGY | Age: 57
End: 2022-06-22
Payer: COMMERCIAL

## 2022-06-22 VITALS
HEART RATE: 84 BPM | HEIGHT: 62 IN | SYSTOLIC BLOOD PRESSURE: 124 MMHG | BODY MASS INDEX: 26.87 KG/M2 | OXYGEN SATURATION: 98 % | WEIGHT: 146 LBS | DIASTOLIC BLOOD PRESSURE: 82 MMHG

## 2022-06-22 DIAGNOSIS — I10 HYPERTENSION, UNSPECIFIED TYPE: ICD-10-CM

## 2022-06-22 DIAGNOSIS — I63.9 STROKE, SMALL VESSEL (HCC): Primary | ICD-10-CM

## 2022-06-22 DIAGNOSIS — E78.49 OTHER HYPERLIPIDEMIA: ICD-10-CM

## 2022-06-22 PROCEDURE — 99214 OFFICE O/P EST MOD 30 MIN: CPT | Performed by: PSYCHIATRY & NEUROLOGY

## 2022-06-22 NOTE — LETTER
6/22/2022    Patient: Tereza Marroquin   YOB: 1965   Date of Visit: 6/22/2022     Shikha Ortiz NP  [address] [place] [id number]  Via Fax: [phone]    Dear Shikha Ortiz NP,      Thank you for referring Ms. Kenzie Royal to [address] [place] [address] for evaluation. My notes for this consultation are attached. If you have questions, please do not hesitate to call me. I look forward to following your patient along with you.       Sincerely,    Matheus Santillan, DO

## 2022-06-22 NOTE — PROGRESS NOTES
Neurology Clinic Follow up Note    Patient ID:  Jeana Rodriguez  [id number]  61 y.o.  1965      Ms. Royal is here for follow up today of stroke. Last Appointment With Me:  Hospital F/U 2/2022 for R BG stroke      Interval History:   Here for stroke f/u. Denies recurrent stroke deficits since discharge. She is compliant with ASA 81mg and statin therapy. She appears to be tolerating medication well. L-HP/numbness has resolved. She is dealing with significant anxiety presently which is preventing her from returning to work full time as a . Plans to discuss this further with her PCP. PMHx/ PSHx/ FHx/ SHx:  Reviewed and unchanged previous visit. Past Medical History:   Diagnosis Date    Hyperlipidemia          ROS:  Comprehensive review of systems negative except for as noted above. Objective:       Meds:  Current Outpatient Medications   Medication Sig Dispense Refill    L.acid/L.casei/B.bif/B.thang/FOS (PROBIOTIC BLEND PO) Take  by mouth.  aspirin 81 mg chewable tablet Take 1 Tablet by mouth daily. 90 Tablet 1    atorvastatin (LIPITOR) 80 mg tablet Take 1 Tablet by mouth nightly. 90 Tablet 1    ascorbic acid (VITAMIN C PO) Take 1 Tablet by mouth daily.  ZINC PO Take 1 Tablet by mouth daily.  cholecalciferol, vitamin D3, (VITAMIN D3 PO) Take 1 Tablet by mouth daily.  losartan-hydroCHLOROthiazide (HYZAAR) 50-12.5 mg per tablet Take 1 Tablet by mouth daily.  omega-3 fatty acids cap Take 1 Caplet by mouth daily. Exam:  Visit Vitals  /82   Pulse 84   Ht 5' 2\" (1.575 m)   Wt 146 lb (66.2 kg)   SpO2 98%   BMI 26.70 kg/m²     NEUROLOGICAL EXAM:  General: Awake, alert, speech fluent  CN: PERRL, EOMI without nystagmus, VFF to confrontation, facial sensation and strength are normal and symmetric, hearing is intact to finger rub bilaterally, palate and tongue movements are intact and symmetric.   Motor: Normal tone, bulk and strength bilaterally. Reflexes: 2/4 throughout  Coordination: FNF, ESTRELLA, HTS intact. Sensation: LT intact throughout. Gait: Normal-based and steady. LABS  Results for orders placed or performed during the hospital encounter of 03/10/22   ECHO RONNIE W OR WO CONTRAST   Result Value Ref Range    TR Peak Gradient 11 mmHg    TR Max Velocity 1.69 m/s       IMAGING:  MRI Results (most recent):  Results from Hospital Encounter encounter on 02/20/22    MRI BRAIN WO CONT    Narrative  *PRELIMINARY REPORT*  Punctate focus of diffusion resection in the right putamen (3-52), may reflect  an acute-subacute infarct. Preliminary report was provided by Dr. Kathryn Buckley, the on-call radiologist, at [phone]  hours  Final report to follow. *END PRELIMINARY REPORT*    *FINAL REPORT BELOW*    EXAM:  MRI BRAIN WO CONT  INDICATION:  acute CVA, patient presents with acute left-sided weakness, nausea,  lightheaded and left-sided numbness. TECHNIQUE:  Sagittal T1, axial FLAIR, T2,T1 and gradient echo images as well as coronal T2  weighted images and axial diffusion weighted images of the head were obtained. COMPARISON:  CT, CTA  FINDINGS:  The ventricular size and configuration are normal.  Punctate focus of restricted diffusion right posterior lentiform nucleus (3, 52)  is consistent with acute small lacunar infarct. Questionable slight restricted  diffusion in the medial right head of caudate nucleus may be artifactual. (3,  51). Of note is a small focus of T2 hyperintensity in the right posterior lentiform  nucleus not associated with restricted diffusion which may represent an old  lacunar infarct. Otherwise normal signal in the brain. No evidence of intracranial hemorrhage, mass or abnormal extra-axial fluid  collections. Flow voids are present in the vertebral basilar and carotid artery systems. The craniocervical junction is unremarkable.   The structures at the cranial base including paranasal sinuses are unremarkable. Impression  Tiny acute right posterior basal ganglia lacunar infarct. Questionable  additional focus head of caudate nucleus. Assessment:     Encounter Diagnoses     ICD-10-CM ICD-9-CM   1. Stroke, small vessel (HCC)  I63.9 434.91   2. Hypertension, unspecified type  I10 401.9   3. Other hyperlipidemia  E78.49 32.4     61year old AAF with a h/o HTN, H[facility] admitted with acute left hemiparesis/hemisensory deficit 2/20/22 s/p tPA. Symptoms are presently resolved with non-focal examination. MRI Brain reveals punctate R basal ganglia infarct. No evidence of LVO or significant stenosis on CTA. Possible small vessel thrombosis. RONNIE and extended cardiac monitoring were unrevealing for cardioembolic mechanism. She has done well post discharge without recurrent stroke deficits. Will continue current antiplatelet therapy for stroke prevention. Plan:   Continue ASA 81mg and statin therapy for stroke prevention  Goal SBP<140, LDL<70  Discussed stroke risk factor modification and need for emergent medical evaluation if any recurrent focal neurologic deficits    Follow-up and Dispositions    · Return in about 1 year (around 6/22/2023). I have discussed the diagnosis with the patient today and the intended plan as seen in the above orders with both the patient as well as referring provider and/or PCP via electronic correspondence. The patient has received an after-visit summary and questions were answered concerning future plans. I have discussed medication side effects and warnings with the patient as well.       Signed:  Parker Prado DO  6/22/2022  8:11 AM

## 2022-08-26 RX ORDER — GUAIFENESIN 100 MG/5ML
LIQUID (ML) ORAL
Qty: 90 TABLET | Refills: 1 | Status: SHIPPED | OUTPATIENT
Start: 2022-08-26

## 2023-05-16 RX ORDER — ATORVASTATIN CALCIUM 80 MG/1
1 TABLET, FILM COATED ORAL NIGHTLY
COMMUNITY
Start: 2022-02-22

## 2023-05-16 RX ORDER — ASPIRIN 81 MG/1
TABLET, CHEWABLE ORAL
COMMUNITY
Start: 2022-08-26

## 2023-05-16 RX ORDER — LOSARTAN POTASSIUM AND HYDROCHLOROTHIAZIDE 12.5; 5 MG/1; MG/1
1 TABLET ORAL DAILY
COMMUNITY

## 2023-07-12 ENCOUNTER — OFFICE VISIT (OUTPATIENT)
Age: 58
End: 2023-07-12
Payer: COMMERCIAL

## 2023-07-12 VITALS
DIASTOLIC BLOOD PRESSURE: 78 MMHG | BODY MASS INDEX: 28.35 KG/M2 | HEART RATE: 79 BPM | HEIGHT: 63 IN | SYSTOLIC BLOOD PRESSURE: 128 MMHG | OXYGEN SATURATION: 97 % | WEIGHT: 160 LBS

## 2023-07-12 DIAGNOSIS — I10 ESSENTIAL (PRIMARY) HYPERTENSION: ICD-10-CM

## 2023-07-12 DIAGNOSIS — E78.49 OTHER HYPERLIPIDEMIA: ICD-10-CM

## 2023-07-12 DIAGNOSIS — Z86.73 REMOTE HISTORY OF STROKE: Primary | ICD-10-CM

## 2023-07-12 PROBLEM — G45.9 TIA (TRANSIENT ISCHEMIC ATTACK): Status: ACTIVE | Noted: 2022-02-01

## 2023-07-12 PROCEDURE — 3074F SYST BP LT 130 MM HG: CPT | Performed by: PSYCHIATRY & NEUROLOGY

## 2023-07-12 PROCEDURE — 99213 OFFICE O/P EST LOW 20 MIN: CPT | Performed by: PSYCHIATRY & NEUROLOGY

## 2023-07-12 PROCEDURE — 3078F DIAST BP <80 MM HG: CPT | Performed by: PSYCHIATRY & NEUROLOGY

## 2023-07-12 NOTE — PROGRESS NOTES
results is:   [facility] Encounter on 03/10/2022   Component Date Value    TR Peak Gradient 03/10/2022 11     TR Max Velocity 03/10/2022 1.69     Left Ventricular Ejectio* 03/10/2022 63     LVEF MODALITY 03/10/2022 ECHO            IMAGING:  MRI Results (most recent):  Results from Hospital Encounter encounter on 02/20/22    MRI BRAIN WO CONT    Narrative  *PRELIMINARY REPORT*  Punctate focus of diffusion resection in the right putamen (3-52), may reflect  an acute-subacute infarct. Preliminary report was provided by Dr. Amalia Tatum, the on-call radiologist, at [phone]  hours  Final report to follow. *END PRELIMINARY REPORT*    *FINAL REPORT BELOW*    EXAM:  MRI BRAIN WO CONT  INDICATION:  acute CVA, patient presents with acute left-sided weakness, nausea,  lightheaded and left-sided numbness. TECHNIQUE:  Sagittal T1, axial FLAIR, T2,T1 and gradient echo images as well as coronal T2  weighted images and axial diffusion weighted images of the head were obtained. COMPARISON:  CT, CTA  FINDINGS:  The ventricular size and configuration are normal.  Punctate focus of restricted diffusion right posterior lentiform nucleus (3, 52)  is consistent with acute small lacunar infarct. Questionable slight restricted  diffusion in the medial right head of caudate nucleus may be artifactual. (3,  51). Of note is a small focus of T2 hyperintensity in the right posterior lentiform  nucleus not associated with restricted diffusion which may represent an old  lacunar infarct. Otherwise normal signal in the brain. No evidence of intracranial hemorrhage, mass or abnormal extra-axial fluid  collections. Flow voids are present in the vertebral basilar and carotid artery systems. The craniocervical junction is unremarkable. The structures at the cranial base including paranasal sinuses are unremarkable. Impression  Tiny acute right posterior basal ganglia lacunar infarct.  Questionable  additional focus head of caudate

## 2025-01-21 ENCOUNTER — HOSPITAL ENCOUNTER (EMERGENCY)
Facility: HOSPITAL | Age: 60
Discharge: HOME | End: 2025-01-21
Attending: EMERGENCY MEDICINE
Payer: COMMERCIAL

## 2025-01-21 VITALS
DIASTOLIC BLOOD PRESSURE: 111 MMHG | SYSTOLIC BLOOD PRESSURE: 151 MMHG | OXYGEN SATURATION: 100 % | BODY MASS INDEX: 29.73 KG/M2 | TEMPERATURE: 98.2 F | WEIGHT: 167.77 LBS | HEIGHT: 63 IN | HEART RATE: 84 BPM | RESPIRATION RATE: 20 BRPM

## 2025-01-21 DIAGNOSIS — R07.81 RIB PAIN: Primary | ICD-10-CM

## 2025-01-21 LAB
ALBUMIN SERPL-MCNC: 3.8 G/DL (ref 3.5–5)
ALBUMIN/GLOB SERPL: 1 (ref 1.1–2.2)
ALP SERPL-CCNC: 79 U/L (ref 45–117)
ALT SERPL-CCNC: 46 U/L (ref 12–78)
ANION GAP SERPL CALC-SCNC: 4 MMOL/L (ref 2–12)
AST SERPL-CCNC: 28 U/L (ref 15–37)
BASOPHILS # BLD: 0.01 K/UL (ref 0–0.1)
BASOPHILS NFR BLD: 0.2 % (ref 0–1)
BILIRUB SERPL-MCNC: 0.4 MG/DL (ref 0.2–1)
BUN SERPL-MCNC: 11 MG/DL (ref 6–20)
BUN/CREAT SERPL: 13 (ref 12–20)
CALCIUM SERPL-MCNC: 9.4 MG/DL (ref 8.5–10.1)
CHLORIDE SERPL-SCNC: 105 MMOL/L (ref 97–108)
CO2 SERPL-SCNC: 28 MMOL/L (ref 21–32)
COMMENT:: NORMAL
CREAT SERPL-MCNC: 0.86 MG/DL (ref 0.55–1.02)
D DIMER PPP FEU-MCNC: 0.23 MG/L FEU (ref 0–0.65)
DIFFERENTIAL METHOD BLD: ABNORMAL
EKG ATRIAL RATE: 70 BPM
EKG DIAGNOSIS: NORMAL
EKG P AXIS: 51 DEGREES
EKG P-R INTERVAL: 154 MS
EKG Q-T INTERVAL: 396 MS
EKG QRS DURATION: 86 MS
EKG QTC CALCULATION (BAZETT): 427 MS
EKG R AXIS: -51 DEGREES
EKG T AXIS: -2 DEGREES
EKG VENTRICULAR RATE: 70 BPM
EOSINOPHIL # BLD: 0.24 K/UL (ref 0–0.4)
EOSINOPHIL NFR BLD: 3.8 % (ref 0–7)
ERYTHROCYTE [DISTWIDTH] IN BLOOD BY AUTOMATED COUNT: 14.3 % (ref 11.5–14.5)
GLOBULIN SER CALC-MCNC: 3.8 G/DL (ref 2–4)
GLUCOSE SERPL-MCNC: 127 MG/DL (ref 65–100)
HCT VFR BLD AUTO: 36.2 % (ref 35–47)
HGB BLD-MCNC: 11.3 G/DL (ref 11.5–16)
IMM GRANULOCYTES # BLD AUTO: 0.03 K/UL (ref 0–0.04)
IMM GRANULOCYTES NFR BLD AUTO: 0.5 % (ref 0–0.5)
LYMPHOCYTES # BLD: 1.54 K/UL (ref 0.8–3.5)
LYMPHOCYTES NFR BLD: 24.5 % (ref 12–49)
MCH RBC QN AUTO: 28.1 PG (ref 26–34)
MCHC RBC AUTO-ENTMCNC: 31.2 G/DL (ref 30–36.5)
MCV RBC AUTO: 90 FL (ref 80–99)
MONOCYTES # BLD: 0.65 K/UL (ref 0–1)
MONOCYTES NFR BLD: 10.3 % (ref 5–13)
NEUTS SEG # BLD: 3.82 K/UL (ref 1.8–8)
NEUTS SEG NFR BLD: 60.7 % (ref 32–75)
NRBC # BLD: 0 K/UL (ref 0–0.01)
NRBC BLD-RTO: 0 PER 100 WBC
PLATELET # BLD AUTO: 247 K/UL (ref 150–400)
PMV BLD AUTO: 11 FL (ref 8.9–12.9)
POTASSIUM SERPL-SCNC: 3.9 MMOL/L (ref 3.5–5.1)
PROT SERPL-MCNC: 7.6 G/DL (ref 6.4–8.2)
RBC # BLD AUTO: 4.02 M/UL (ref 3.8–5.2)
SODIUM SERPL-SCNC: 137 MMOL/L (ref 136–145)
SPECIMEN HOLD: NORMAL
WBC # BLD AUTO: 6.3 K/UL (ref 3.6–11)

## 2025-01-21 PROCEDURE — 85025 COMPLETE CBC W/AUTO DIFF WBC: CPT

## 2025-01-21 PROCEDURE — 93010 ELECTROCARDIOGRAM REPORT: CPT | Performed by: SPECIALIST

## 2025-01-21 PROCEDURE — 80053 COMPREHEN METABOLIC PANEL: CPT

## 2025-01-21 PROCEDURE — 36415 COLL VENOUS BLD VENIPUNCTURE: CPT

## 2025-01-21 PROCEDURE — 99284 EMERGENCY DEPT VISIT MOD MDM: CPT

## 2025-01-21 PROCEDURE — 85379 FIBRIN DEGRADATION QUANT: CPT

## 2025-01-21 PROCEDURE — 93005 ELECTROCARDIOGRAM TRACING: CPT

## 2025-01-21 RX ORDER — LIDOCAINE 50 MG/G
1 PATCH TOPICAL DAILY
Qty: 10 PATCH | Refills: 0 | Status: SHIPPED | OUTPATIENT
Start: 2025-01-21 | End: 2025-01-31

## 2025-01-21 RX ORDER — LIDOCAINE 50 MG/G
1 PATCH TOPICAL DAILY
Qty: 10 PATCH | Refills: 0 | Status: SHIPPED | OUTPATIENT
Start: 2025-01-21 | End: 2025-01-21

## 2025-01-21 RX ORDER — KETOROLAC TROMETHAMINE 10 MG/1
10 TABLET, FILM COATED ORAL EVERY 6 HOURS PRN
Qty: 20 TABLET | Refills: 0 | Status: SHIPPED | OUTPATIENT
Start: 2025-01-21

## 2025-01-21 RX ORDER — KETOROLAC TROMETHAMINE 10 MG/1
10 TABLET, FILM COATED ORAL EVERY 6 HOURS PRN
Qty: 20 TABLET | Refills: 0 | Status: SHIPPED | OUTPATIENT
Start: 2025-01-21 | End: 2025-01-21

## 2025-01-21 ASSESSMENT — PAIN DESCRIPTION - FREQUENCY: FREQUENCY: CONTINUOUS

## 2025-01-21 ASSESSMENT — PAIN DESCRIPTION - ONSET: ONSET: ON-GOING

## 2025-01-21 ASSESSMENT — PAIN DESCRIPTION - PAIN TYPE: TYPE: ACUTE PAIN

## 2025-01-21 ASSESSMENT — PAIN DESCRIPTION - LOCATION: LOCATION: ABDOMEN;RIB CAGE

## 2025-01-21 ASSESSMENT — PAIN - FUNCTIONAL ASSESSMENT: PAIN_FUNCTIONAL_ASSESSMENT: ACTIVITIES ARE NOT PREVENTED

## 2025-01-21 ASSESSMENT — PAIN SCALES - GENERAL: PAINLEVEL_OUTOF10: 3

## 2025-01-21 ASSESSMENT — PAIN DESCRIPTION - ORIENTATION: ORIENTATION: LEFT

## 2025-01-21 ASSESSMENT — PAIN DESCRIPTION - DESCRIPTORS: DESCRIPTORS: SHARP

## 2025-01-21 NOTE — ED PROVIDER NOTES
Weight: 76.1 kg (167 lb 12.3 oz)   Height: 1.594 m (5' 2.75\")           Medical Decision Making  Kaitlynn Escoto is a 59 y.o. female who presents to the emergency department for left-sided rib pain and 1 episode of lightheadedness that occurred earlier this morning. Vitals in triage are benign. Physical exam shows well-appearing nontoxic female who is sitting comfortably.  Abdomen is nontender nonperitoneal.  Clear lung sounds on auscultation bilaterally.  Clear cardiac sounds on auscultation.  No focal neuro deficit, gait is normal, cranial nerves II through XI are intact.  Differential diagnosis includes but is not limited to arrhythmia, anemia, electrolyte imbalance, rib strain, anxiety, PE.  Labs performed today included CBC, CMP, D-dimer which were unremarkable.  I considered further imaging the patient's chest to rule out PE however this time her D-dimer is normal, her vitals in triage were within normal limits so have a low suspicion for PE at this time.  I considered a chest x-ray to rule out pneumonia however at this time patient's lung sounds are clear to auscultation on physical exam, she is afebrile and not complaining of a cough or congestion making pneumonia unlikely at this time.  I discussed all results with the patient in detail today.  Upon further chart review it indicated that patient was previously seen at her primary care office earlier today for the same issue.  At that visit she received an EKG which was normal sinus rhythm, a chest x-ray which showed no acute fracture or cardiopulmonary pathology.  Considering the patient's pain is reproducible on physical exam with palpation, is positional and improving with over-the-counter ibuprofen I suspect this is more musculoskeletal related.  At this time patient is stable for discharge and return precautions were discussed. Patient verbalized understanding. I advised close follow up with primary care physician for further evaluation.

## 2025-01-21 NOTE — DISCHARGE INSTRUCTIONS
We discussed your results today.  It is important that you follow-up with your primary care physician in the next couple days for further evaluation.  Return to the emergency department if you develop worsening symptoms, shortness of breath, or chest pain.  You prescribed Toradol and the Lidocaine patches to use as needed for your rib pain.